# Patient Record
Sex: FEMALE | Race: WHITE | URBAN - METROPOLITAN AREA
[De-identification: names, ages, dates, MRNs, and addresses within clinical notes are randomized per-mention and may not be internally consistent; named-entity substitution may affect disease eponyms.]

---

## 2017-01-09 ENCOUNTER — OFFICE VISIT (OUTPATIENT)
Dept: INTERNAL MEDICINE CLINIC | Age: 82
End: 2017-01-09

## 2017-01-09 VITALS
BODY MASS INDEX: 18.29 KG/M2 | DIASTOLIC BLOOD PRESSURE: 80 MMHG | WEIGHT: 100 LBS | SYSTOLIC BLOOD PRESSURE: 160 MMHG | HEART RATE: 70 BPM

## 2017-01-09 DIAGNOSIS — E78.2 MIXED HYPERLIPIDEMIA: ICD-10-CM

## 2017-01-09 DIAGNOSIS — I10 ESSENTIAL HYPERTENSION: Primary | ICD-10-CM

## 2017-01-09 LAB
A/G RATIO: 1.4 (ref 1.1–2.2)
ALBUMIN SERPL-MCNC: 4.1 G/DL (ref 3.4–5)
ALP BLD-CCNC: 88 U/L (ref 40–129)
ALT SERPL-CCNC: 11 U/L (ref 10–40)
ANION GAP SERPL CALCULATED.3IONS-SCNC: 15 MMOL/L (ref 3–16)
AST SERPL-CCNC: 18 U/L (ref 15–37)
BILIRUB SERPL-MCNC: 0.4 MG/DL (ref 0–1)
BUN BLDV-MCNC: 22 MG/DL (ref 7–20)
CALCIUM SERPL-MCNC: 10 MG/DL (ref 8.3–10.6)
CHLORIDE BLD-SCNC: 101 MMOL/L (ref 99–110)
CHOLESTEROL, TOTAL: 315 MG/DL (ref 0–199)
CO2: 26 MMOL/L (ref 21–32)
CREAT SERPL-MCNC: 0.7 MG/DL (ref 0.6–1.2)
GFR AFRICAN AMERICAN: >60
GFR NON-AFRICAN AMERICAN: >60
GLOBULIN: 2.9 G/DL
GLUCOSE BLD-MCNC: 101 MG/DL (ref 70–99)
HDLC SERPL-MCNC: 65 MG/DL (ref 40–60)
LDL CHOLESTEROL CALCULATED: 233 MG/DL
POTASSIUM SERPL-SCNC: 4.7 MMOL/L (ref 3.5–5.1)
SODIUM BLD-SCNC: 142 MMOL/L (ref 136–145)
TOTAL PROTEIN: 7 G/DL (ref 6.4–8.2)
TRIGL SERPL-MCNC: 86 MG/DL (ref 0–150)
VLDLC SERPL CALC-MCNC: 17 MG/DL

## 2017-01-09 PROCEDURE — 36415 COLL VENOUS BLD VENIPUNCTURE: CPT | Performed by: INTERNAL MEDICINE

## 2017-01-09 PROCEDURE — 99213 OFFICE O/P EST LOW 20 MIN: CPT | Performed by: INTERNAL MEDICINE

## 2017-01-09 ASSESSMENT — ENCOUNTER SYMPTOMS
GASTROINTESTINAL NEGATIVE: 1
RESPIRATORY NEGATIVE: 1

## 2017-02-03 RX ORDER — ATORVASTATIN CALCIUM 80 MG/1
TABLET, FILM COATED ORAL
Qty: 30 TABLET | Refills: 11 | Status: SHIPPED | OUTPATIENT
Start: 2017-02-03

## 2017-03-10 RX ORDER — B-COMPLEX WITH VITAMIN C
TABLET ORAL
Qty: 60 TABLET | Refills: 5 | Status: SHIPPED | OUTPATIENT
Start: 2017-03-10

## 2017-04-17 ENCOUNTER — OFFICE VISIT (OUTPATIENT)
Dept: INTERNAL MEDICINE CLINIC | Age: 82
End: 2017-04-17

## 2017-04-17 VITALS
SYSTOLIC BLOOD PRESSURE: 150 MMHG | WEIGHT: 101.8 LBS | DIASTOLIC BLOOD PRESSURE: 88 MMHG | RESPIRATION RATE: 14 BRPM | HEART RATE: 70 BPM | BODY MASS INDEX: 18.62 KG/M2

## 2017-04-17 DIAGNOSIS — E78.2 MIXED HYPERLIPIDEMIA: ICD-10-CM

## 2017-04-17 DIAGNOSIS — M81.0 OSTEOPOROSIS: ICD-10-CM

## 2017-04-17 DIAGNOSIS — I10 ESSENTIAL HYPERTENSION: Primary | ICD-10-CM

## 2017-04-17 PROCEDURE — G8427 DOCREV CUR MEDS BY ELIG CLIN: HCPCS | Performed by: INTERNAL MEDICINE

## 2017-04-17 PROCEDURE — 99214 OFFICE O/P EST MOD 30 MIN: CPT | Performed by: INTERNAL MEDICINE

## 2017-04-17 PROCEDURE — 1090F PRES/ABSN URINE INCON ASSESS: CPT | Performed by: INTERNAL MEDICINE

## 2017-04-17 PROCEDURE — 1036F TOBACCO NON-USER: CPT | Performed by: INTERNAL MEDICINE

## 2017-04-17 PROCEDURE — 4040F PNEUMOC VAC/ADMIN/RCVD: CPT | Performed by: INTERNAL MEDICINE

## 2017-04-17 PROCEDURE — G8419 CALC BMI OUT NRM PARAM NOF/U: HCPCS | Performed by: INTERNAL MEDICINE

## 2017-04-17 PROCEDURE — 4005F PHARM THX FOR OP RXD: CPT | Performed by: INTERNAL MEDICINE

## 2017-04-17 PROCEDURE — 1123F ACP DISCUSS/DSCN MKR DOCD: CPT | Performed by: INTERNAL MEDICINE

## 2017-04-17 ASSESSMENT — ENCOUNTER SYMPTOMS
GASTROINTESTINAL NEGATIVE: 1
RESPIRATORY NEGATIVE: 1

## 2017-06-12 ENCOUNTER — OFFICE VISIT (OUTPATIENT)
Dept: INTERNAL MEDICINE CLINIC | Age: 82
End: 2017-06-12

## 2017-06-12 VITALS
DIASTOLIC BLOOD PRESSURE: 80 MMHG | HEART RATE: 90 BPM | RESPIRATION RATE: 14 BRPM | BODY MASS INDEX: 17.93 KG/M2 | WEIGHT: 101.2 LBS | SYSTOLIC BLOOD PRESSURE: 160 MMHG

## 2017-06-12 DIAGNOSIS — H61.21 IMPACTED CERUMEN OF RIGHT EAR: Primary | ICD-10-CM

## 2017-06-12 PROCEDURE — 4040F PNEUMOC VAC/ADMIN/RCVD: CPT | Performed by: INTERNAL MEDICINE

## 2017-06-12 PROCEDURE — 1036F TOBACCO NON-USER: CPT | Performed by: INTERNAL MEDICINE

## 2017-06-12 PROCEDURE — 69209 REMOVE IMPACTED EAR WAX UNI: CPT | Performed by: INTERNAL MEDICINE

## 2017-06-12 PROCEDURE — 1123F ACP DISCUSS/DSCN MKR DOCD: CPT | Performed by: INTERNAL MEDICINE

## 2017-06-12 PROCEDURE — G8427 DOCREV CUR MEDS BY ELIG CLIN: HCPCS | Performed by: INTERNAL MEDICINE

## 2017-06-12 PROCEDURE — 1090F PRES/ABSN URINE INCON ASSESS: CPT | Performed by: INTERNAL MEDICINE

## 2017-06-12 PROCEDURE — 99213 OFFICE O/P EST LOW 20 MIN: CPT | Performed by: INTERNAL MEDICINE

## 2017-06-12 PROCEDURE — G8419 CALC BMI OUT NRM PARAM NOF/U: HCPCS | Performed by: INTERNAL MEDICINE

## 2017-06-12 ASSESSMENT — ENCOUNTER SYMPTOMS
RHINORRHEA: 0
SINUS PRESSURE: 0

## 2017-07-26 ENCOUNTER — OFFICE VISIT (OUTPATIENT)
Dept: INTERNAL MEDICINE CLINIC | Age: 82
End: 2017-07-26

## 2017-07-26 VITALS
DIASTOLIC BLOOD PRESSURE: 78 MMHG | BODY MASS INDEX: 18.07 KG/M2 | HEART RATE: 100 BPM | RESPIRATION RATE: 16 BRPM | SYSTOLIC BLOOD PRESSURE: 122 MMHG | WEIGHT: 102 LBS | OXYGEN SATURATION: 93 %

## 2017-07-26 DIAGNOSIS — I10 ESSENTIAL HYPERTENSION: Primary | ICD-10-CM

## 2017-07-26 PROCEDURE — 1036F TOBACCO NON-USER: CPT | Performed by: INTERNAL MEDICINE

## 2017-07-26 PROCEDURE — 99213 OFFICE O/P EST LOW 20 MIN: CPT | Performed by: INTERNAL MEDICINE

## 2017-07-26 PROCEDURE — G8427 DOCREV CUR MEDS BY ELIG CLIN: HCPCS | Performed by: INTERNAL MEDICINE

## 2017-07-26 PROCEDURE — 4040F PNEUMOC VAC/ADMIN/RCVD: CPT | Performed by: INTERNAL MEDICINE

## 2017-07-26 PROCEDURE — 1090F PRES/ABSN URINE INCON ASSESS: CPT | Performed by: INTERNAL MEDICINE

## 2017-07-26 PROCEDURE — 1123F ACP DISCUSS/DSCN MKR DOCD: CPT | Performed by: INTERNAL MEDICINE

## 2017-07-26 PROCEDURE — G8419 CALC BMI OUT NRM PARAM NOF/U: HCPCS | Performed by: INTERNAL MEDICINE

## 2017-07-26 ASSESSMENT — ENCOUNTER SYMPTOMS: RESPIRATORY NEGATIVE: 1

## 2017-09-12 ENCOUNTER — TELEPHONE (OUTPATIENT)
Dept: INTERNAL MEDICINE CLINIC | Age: 82
End: 2017-09-12

## 2017-09-12 PROBLEM — L02.419 CELLULITIS AND ABSCESS OF LEG: Status: ACTIVE | Noted: 2017-09-12

## 2017-09-12 PROBLEM — L03.119 CELLULITIS AND ABSCESS OF LEG: Status: ACTIVE | Noted: 2017-09-12

## 2017-09-13 PROBLEM — E46 PROTEIN CALORIE MALNUTRITION (HCC): Status: ACTIVE | Noted: 2017-09-13

## 2017-09-13 PROBLEM — I73.9 PVD (PERIPHERAL VASCULAR DISEASE) (HCC): Status: ACTIVE | Noted: 2017-09-13

## 2017-09-14 PROBLEM — L03.119 CELLULITIS AND ABSCESS OF LEG: Status: RESOLVED | Noted: 2017-09-12 | Resolved: 2017-09-14

## 2017-09-14 PROBLEM — Z86.79 HISTORY OF ATRIAL FIBRILLATION: Status: ACTIVE | Noted: 2017-09-14

## 2017-09-14 PROBLEM — L02.419 CELLULITIS AND ABSCESS OF LEG: Status: RESOLVED | Noted: 2017-09-12 | Resolved: 2017-09-14

## 2017-09-15 ENCOUNTER — TELEPHONE (OUTPATIENT)
Dept: INTERNAL MEDICINE CLINIC | Age: 82
End: 2017-09-15

## 2017-09-15 RX ORDER — CLINDAMYCIN HYDROCHLORIDE 300 MG/1
300 CAPSULE ORAL 3 TIMES DAILY
Qty: 21 CAPSULE | Refills: 0 | Status: SHIPPED | OUTPATIENT
Start: 2017-09-15 | End: 2017-09-22

## 2017-10-02 ENCOUNTER — HOSPITAL ENCOUNTER (OUTPATIENT)
Dept: WOUND CARE | Age: 82
Discharge: HOME | End: 2017-10-02
Attending: SURGERY | Admitting: SURGERY

## 2017-10-02 VITALS
HEIGHT: 63 IN | RESPIRATION RATE: 18 BRPM | HEART RATE: 80 BPM | TEMPERATURE: 97.1 F | BODY MASS INDEX: 18.24 KG/M2 | WEIGHT: 102.95 LBS | SYSTOLIC BLOOD PRESSURE: 95 MMHG | DIASTOLIC BLOOD PRESSURE: 62 MMHG

## 2017-10-02 DIAGNOSIS — I70.244 ATHEROSCLEROSIS OF NATIVE ARTERY OF LEFT LOWER EXTREMITY WITH ULCERATION OF MIDFOOT (HCC): ICD-10-CM

## 2017-10-02 PROCEDURE — 11043 DBRDMT MUSC&/FSCA 1ST 20/<: CPT | Performed by: SURGERY

## 2017-10-02 RX ORDER — LIDOCAINE HYDROCHLORIDE 40 MG/ML
SOLUTION TOPICAL ONCE
Status: DISCONTINUED | OUTPATIENT
Start: 2017-10-02 | End: 2017-10-03 | Stop reason: HOSPADM

## 2017-10-02 NOTE — IP AVS SNAPSHOT
Pneumococcal Conjugate 7-valent 10/22/2004 -- -- --    Pneumococcal Polysaccharide ([id number]) 7/22/2013 0.5 mL 10/6/2009 Intramuscular      Last Vitals          Most Recent Value    Temp  97.1 °F (36.2 °C)    Pulse  80    Resp  18    BP  95/62         After Visit Summary    This summary was created for you. Thank you for entrusting your care to us. The following information includes details about your hospital/visit stay along with steps you should take to help with your recovery once you leave the hospital.  In this packet, you will find information about the topics listed below:    · Instructions about your medications including a list of your home medications  · A summary of your hospital visit  · Follow-up appointments once you have left the hospital  · Your care plan at home      You may receive a survey regarding the care you received during your stay. Your input is valuable to us. We encourage you to complete and return your survey in the envelope provided. We hope you will choose us in the future for your healthcare needs. Patient Information     Patient Name Mayda 16, [address]  9/11/1917      Care Provided at:     Name Address Phone       [address] [place] [address] [phone]            Your Visit    Here you will find information about your visit, including the reason for your visit. Please take this sheet with you when you visit your doctor or other health care provider in the future. It will help determine the best possible medical care for you at that time. If you have any questions once you leave the hospital, please call the department phone number listed below. Why you were here     Your primary diagnosis was:  Not on File      Visit Information     Date & Time Provider Department Dept.  Phone    10/2/2017 Khurram Saavedra, [address] [phone]       Follow-up Appointments Certain functionality such as prescription refills, scheduling appointments or sending messages to your provider are not activated if your provider does not use [facility] in his/her office    For questions regarding your MyChart account call [phone]. If you have a clinical question, please call your doctor's office. The information on all pages of the After Visit Summary has been reviewed with me, the patient and/or responsible adult, by my health care provider(s). I had the opportunity to ask questions regarding this information. I understand I should dispose of my armband safely at home to protect my health information. A complete copy of the After Visit Summary has been given to me, the patient and/or responsible adult.            Patient Signature/Responsible Adult:____________________    Clinician Signature:_____________________    Date:_____________________    Time:_____________________

## 2017-10-02 NOTE — PLAN OF CARE
Problem: Wound:  Goal: Will show signs of wound healing; wound closure and no evidence of infection  Will show signs of wound healing; wound closure and no evidence of infection  Outcome: Ongoing    Problem: Pressure Ulcer:  Goal: Signs of wound healing will improve  Signs of wound healing will improve  Outcome: Ongoing  Goal: Absence of new pressure ulcer  Absence of new pressure ulcer  Outcome: Ongoing  Goal: Will show no infection signs and symptoms  Will show no infection signs and symptoms  Outcome: Ongoing    Problem: Falls - Risk of:  Goal: Will remain free from falls  Will remain free from falls  Outcome: Ongoing

## 2017-10-02 NOTE — IP AVS SNAPSHOT
Patient Information     Patient Name Mayda Gabriel, [address] 9/11/1917      SEDATION/ANALGESIA INFORMATION/HOME GOING ADVICE     SEDATION / ANALGESIA INFORMATION / Donna Nash 85 have received the sedation/analgesia medication during your visit    Sedation/analgesia is used during short medical procedures under controlled supervision. The medication will produce a strong relaxation. You will be able to hear, speak and follow instructions, but your memory and alertness will be decreased. You will be able to swallow and breathe on your own. During sedation/analgesia your blood pressure, heart and breathing will be watched closely. After the procedure, you may not remember what was said or done. You may have the following effects from the medication. \" Drowsiness, dizziness, sleepiness or confusion. \" Difficulty remembering or delayed reaction times. \" Loss of fine muscle control or difficulty with your balance especially while walking. \" Difficulty focusing or blurred vision. You may not be aware of slight changes in your behavior and/or your reaction time because of the medication used during the procedure. Therefore you should follow these instructions. \" Have someone responsible help you with your care. \" Do not drive for 24 hours. \" Do not operate equipment for 24 hours (lawnmowers, power tools, kitchen accessories, stove). \" Do not drink any alcoholic beverages for a minimum of 24 hours. \" Do not make important personal, legal or business decisions for 24 hours. \" You may experience dizziness or lightheadedness. Move slowly and carefully, do not make sudden position changes. \" Drink extra amounts of fluids today. \" Increase your diet as tolerated (unless you have received specific instructions from your doctor). \" If you feel nauseated, continue with liquids until the nausea is gone. \" Notify your physician if you have not urinated within 8 hours after the procedure. \" Resume your medications unless otherwise instructed. Contact your physician if you have any questions or concerns.     IF YOU REPORT TO AN EMERGENCY ROOM, DOCTOR'S OFFICE OR HOSPITAL WITHIN 24 HOURS AFTER YOUR PROCEDURE, BRING THIS SHEET AND YOUR AFTER VISIT SUMMARY WITH YOU AND GIVE IT TO THE PHYSICIAN OR NURSE ATTENDING YOU.

## 2017-10-02 NOTE — PROGRESS NOTES
Aleksandra Tolliver 37   Progress Note and Procedure Note      Parker Santillan RECORD NUMBER:  [id number]  AGE: 80 y.o. GENDER: female  : 1917  EPISODE DATE:  10/2/2017    Subjective:     Chief Complaint   Patient presents with    Wound Check     Initial Visit- Left Foot Wound-Noticed by POA on 9/10/17-Unknown origin         HISTORY of PRESENT ILLNESS TERESA Medley is a 80 y.o. female who presents today for wound/ulcer evaluation. History of Wound Context: left foot ulcer  Wound/Ulcer Pain Timing/Severity: mild  Quality of pain: dull  Severity:  2 / 10   Modifying Factors: Pain is relieved/improved with rest  Associated Signs/Symptoms: none    Ulcer Identification:  Ulcer Type: arterial  Contributing Factors: arterial insufficiency    Wound: N/A        PAST MEDICAL HISTORY        Diagnosis Date    Hyperlipidemia     Hypertension        PAST SURGICAL HISTORY    Past Surgical History:   Procedure Laterality Date    HERNIA REPAIR         FAMILY HISTORY    History reviewed. No pertinent family history. SOCIAL HISTORY    Social History   Substance Use Topics    Smoking status: Never Smoker    Smokeless tobacco: Never Used    Alcohol use No       ALLERGIES    No Known Allergies    MEDICATIONS    Current Outpatient Prescriptions on File Prior to Encounter   Medication Sig Dispense Refill    Calcium Carbonate-Vitamin D (OYSTER SHELL CALCIUM/D) 500-200 MG-UNIT TABS TAKE ONE TABLET BY MOUTH TWICE A DAY 60 tablet 5    atorvastatin (LIPITOR) 80 MG tablet TAKE ONE TABLET BY MOUTH DAILY 30 tablet 11    lisinopril-hydrochlorothiazide (PRINZIDE;ZESTORETIC) 10-12.5 MG per tablet Take 1 tablet by mouth daily 30 tablet 11    alendronate (FOSAMAX) 70 MG tablet TAKE ONE TABLET BY MOUTH ONCE WEEKLY 4 tablet 5    aspirin 81 MG EC tablet Take 81 mg by mouth daily. No current facility-administered medications on file prior to encounter.         REVIEW OF SYSTEMS    A comprehensive review of systems was negative. Objective:      BP 95/62  Pulse 80  Temp 97.1 °F (36.2 °C) (Oral)   Resp 18  Ht 5' 3\" (1.6 m)  Wt 102 lb 15.3 oz (46.7 kg)  BMI 18.24 kg/m2    Wt Readings from Last 3 Encounters:   10/02/17 102 lb 15.3 oz (46.7 kg)   09/12/17 102 lb 15.3 oz (46.7 kg)   07/26/17 102 lb (46.3 kg)       PHYSICAL EXAM    General Appearance: alert and oriented to person, place and time, well developed and well- nourished, in no acute distress  Skin: warm and dry, no rash or erythema  Head: normocephalic and atraumatic  Eyes: pupils equal, round, and reactive to light, extraocular eye movements intact, conjunctivae normal  Neck: supple and non-tender without mass, no thyromegaly or thyroid nodules, no cervical lymphadenopathy  Musculoskeletal: normal range of motion, no joint swelling, deformity or tenderness  Neurologic: reflexes normal and symmetric, no cranial nerve deficit, gait, coordination and speech normal      Assessment:      Patient Active Problem List   Diagnosis Code    Acute bronchitis J20.9    Other abnormal blood chemistry R79.89    Fatigue R53.83    Edema R60.9    Unilat ing hernia w/ obst, W/O MENTION GANGRENE K40.30    Spondylosis M47.9    Inguinal hernia RIGHT K40.90    Osteoporosis M81.0    Essential hypertension I10    Mixed hyperlipidemia E78.2    PVD (peripheral vascular disease) ([facility]) I73.9    Protein calorie malnutrition ([address] E46    Cellulitis of left lower extremity L03. 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 Performed by:  Bennett Watt MD    Consent obtained:  Yes    Time out taken:  Yes    Pain Control: Anesthetic  Anesthetic: 4% Topical Xylocaine (2.5 ml) [] Other:      Edema Control:  Apply: [] Compression Stocking []Right Leg []Left Leg   [] Tubigrip []Right Leg Double Layer []Left Leg Double Layer       []Right Leg Single Layer []Left Leg Single Layer   [] SpandaGrip []Right Leg  []Left Leg      []Low compression 5-10 mm/Hg      []Medium compression 10-20 mm/Hg     []High compression  20-30 mm/Hg   every morning immediately when getting up should be applied to affected leg(s) from mid foot to knee making sure to cover the heel. Remove every night before going to bed. [] Elevate leg(s) above the level of the heart when sitting. [] Avoid prolonged standing in one place. [] Elevate arm/hand above the level of the heart []RightArm []LeftArm     Compression:  Apply: [] Multilayer Compression Wrap Applied in Clinic []RightLeg []Left Leg   [] Multi-layer compression. Do not get leg(s) with wrap wet. If wraps become too tight call the center or completely remove the wrap. [] Elevate leg(s) above the level of the heart when sitting. [] Avoid prolonged standing in one place.     Off-Loading:   [] Off-loading when [] walking  [] in bed [] sitting  [] Total non-weight bearing  [] Right Leg  [] Left Leg   [] Assistive Device [] Walker [] Cane  [] Wheelchair  [] Crutches   [] Surgical shoe    [] Podus Boot(s)   [] Foam Boot(s)  [] Roll About    [] Cast Boot [] CROW Boot  [] Other:         Dietary:  [x] Diet as tolerated: [] Calorie Diabetic Diet: [] No Added Salt:  [] Increase Protein: [] Other:   Activity:  [x] Activity as tolerated:  [] Patient has no activity restrictions     [] Strict Bedrest: [] Remain off Work:     [] May return to full duty work:                                   [] Return to work with restrictions:     Return Appointment:  [] Wound and dressing supply provider:   [x] ECF or Home Healthcare: [facility]  [] Nurse visit:  [] Physician or NP scheduled for Nurse Visit:   [x] Return Appointment: With Dr Patti Sandoval  in  [address] [place] [address])  [] Ordered

## 2017-10-09 ENCOUNTER — HOSPITAL ENCOUNTER (OUTPATIENT)
Dept: WOUND CARE | Age: 82
Discharge: HOME | End: 2017-10-09
Attending: SURGERY | Admitting: SURGERY

## 2017-10-09 VITALS
TEMPERATURE: 97.7 F | HEART RATE: 84 BPM | SYSTOLIC BLOOD PRESSURE: 115 MMHG | DIASTOLIC BLOOD PRESSURE: 79 MMHG | RESPIRATION RATE: 18 BRPM

## 2017-10-09 DIAGNOSIS — I70.244 ATHEROSCLEROSIS OF NATIVE ARTERY OF LEFT LOWER EXTREMITY WITH ULCERATION OF MIDFOOT (HCC): Primary | ICD-10-CM

## 2017-10-09 PROCEDURE — 11043 DBRDMT MUSC&/FSCA 1ST 20/<: CPT | Performed by: SURGERY

## 2017-10-09 RX ORDER — LIDOCAINE HYDROCHLORIDE 40 MG/ML
SOLUTION TOPICAL ONCE
Status: DISCONTINUED | OUTPATIENT
Start: 2017-10-09 | End: 2017-10-10 | Stop reason: HOSPADM

## 2017-10-09 NOTE — PROGRESS NOTES
debrided down through and including the removal of epidermis, dermis and subcutaneous tissue and muscle/tendon      Devitalized Tissue Debrided:  fibrin, biofilm and slough    Pre Debridement Measurements:  Are located in the [facility]  Documentation Flow Sheet    Wound/Ulcer #: 1    Post Debridement Measurements:  Wound/Ulcer Descriptions are Pre Debridement except measurements:    Wound 10/02/17 Foot Left;Medial #1 Left Medial Foot-Noticed on 9/10/17 (Active)   Wound Arterial 10/2/2017  3:06 PM   Dressing Status Old drainage 10/9/2017  2:05 PM   Dressing/Treatment Other (Comment) 10/2/2017  3:49 PM   Wound Length (cm) 1.9 cm 10/9/2017  2:05 PM   Wound Width (cm) 1.7 cm 10/9/2017  2:05 PM   Wound Depth (cm)  0.5 10/9/2017  2:05 PM   Calculated Wound Size (cm^2) (l*w) 3.23 cm^2 10/9/2017  2:05 PM   Change in Wound Size % (l*w) 10.28 10/9/2017  2:05 PM   Undermining Starts ___ O'Clock 10 10/2/2017  3:06 PM   Undermining Ends___ O'Clock 3 10/2/2017  3:06 PM   Undermining Maxium Distance (cm) 0.2 10/2/2017  3:06 PM   Wound Assessment Slough; Yellow; Swelling;Pink 10/9/2017  2:05 PM   Margins Undefined edges 10/9/2017  2:05 PM   Sonia-wound Assessment Pink;Swelling 10/9/2017  2:05 PM   Red%Wound Bed 50 10/9/2017  2:05 PM   Yellow%Wound Bed 50 10/9/2017  2:05 PM   Black%Wound Bed 10 10/2/2017  3:06 PM   Drainage Amount Small 10/9/2017  2:05 PM   Drainage Description Serosanguinous 10/9/2017  2:05 PM   Odor None 10/9/2017  2:05 PM   Op First Treatment Date 10/02/17 10/2/2017  3:06 PM   Number of days: 6       Percent of Wound/Ulcer Debrided: 100%    Total Surface Area Debrided:  3.23 sq cm       Diabetic/Pressure/Non Pressure Ulcers only:  Ulcer: Non-Pressure ulcer, muscle necrosis      Estimated Blood Loss:  Minimal    Hemostasis Achieved:  not needed    Procedural Pain:  3  / 10     Post Procedural Pain:  0 / 10     Response to treatment:  Well tolerated by patient.        Plan:     Treatment Note please see attached [x] Daily                        [] Every Other Day                  [] Three times per week                        [] Once a week            [] Do Not Change Dressing                   [] Other:                          Compression:  Apply:            [] Multilayer Compression Wrap Applied in Clinic                  []RightLeg                    []Left Leg                        [] Multi-layer compression. Do not get leg(s) with wrap wet. If wraps become too tight call the center or completely remove the wrap. [] Elevate leg(s) above the level of the heart when sitting.                                         [] Avoid prolonged standing in one place.     Off-Loading:   [] Off-loading when                  [] walking                     [] in bed           [] sitting  [] Total non-weight bearing  [] Right Leg  [] Left Leg                    [x] Assistive Device                   [] Walker                      [] Cane             [x] Wheelchair                [] Crutches                        [] Surgical shoe    [] Podus Boot(s)   [] Foam Boot(s)  [] Roll About                        [] Cast Boot                 [] CROW Boot  [] Other:            Dietary:  [x] Diet as tolerated:                  [] Calorie Diabetic Diet:           [] No Added Salt:  [] Increase Protein:                  [] Other:             Activity:  [x] Activity as tolerated:  [] Patient has no activity restrictions     [] Strict Bedrest:                      [] Remain off Work:     [] May return to full duty work:                                                   [] Return to work with restrictions:                    Return Appointment:  [] Wound and dressing supply provider:   [x] ECF or Home Healthcare: [facility]  [] Nurse visit:               [] Physician or NP scheduled for Nurse Visit:   [x] Return Appointment: With Dr Ant Evans  in  [facility])  [] Ordered tests:      Nurse Case Manger: Tanesha     Electronically signed by Reece Fletcher RN on 10/9/2017 at 2:21 PM    [address] [place] [address] Information: Should you experience any significant changes in your wound(s) or have questions about your wound care, please contact the [address] [place] [address] at [address] 8:30 am - 4:30 pm and Friday 8:30 am - 2:00 pm.  If you need help with your wound outside these hours and cannot wait until we are again available, contact your PCP or go to the hospital emergency room.      Nurse Signature:_______________________     Date: ___________ Time:  ____________        Discharge Nurse Signature        PLEASE NOTE: IF YOU ARE UNABLE TO OBTAIN WOUND SUPPLIES, CONTINUE TO USE THE SUPPLIES YOU HAVE AVAILABLE UNTIL YOU ARE ABLE TO [facility]. IT IS MOST IMPORTANT TO KEEP THE WOUND COVERED AT ALL TIMES.     Physician Signature:_______________________     Date: ___________ Time:  ____________                              [] Dr Ellie Christiansen    [] Dr Eugenio Russell   [] Ofelia Randolph CNP                        [x] Dr Guzman Longoria  [] Dr Marshall Yañez   [] Dr Latina Hodgkins                       [] Dr Griselda Gable                          [] Dr Jona Alvarez   [] Jessa Park NP    [] Dr. Jacobo Whitehead        The information contained in the After Visit Summary has been reviewed with me, the patient and/or responsible adult, by my health care provider(s). I had the opportunity to ask questions regarding this information.   I have elected to receive;        Patient Signature:_______________________     Date: ___________ Time:  ____________     [] Patient unable to sign Discharge Instructions given to ECF/Transportation/POA        []  After Visit Summary  [x]  Comprehensive Discharge Instruction          Electronically signed by Gadiel Huffman MD on 10/9/2017 at 2:46 PM

## 2017-10-18 ENCOUNTER — HOSPITAL ENCOUNTER (OUTPATIENT)
Dept: WOUND CARE | Age: 82
Discharge: HOME | End: 2017-10-18
Attending: PODIATRIST | Admitting: PODIATRIST

## 2017-10-18 VITALS
HEART RATE: 83 BPM | SYSTOLIC BLOOD PRESSURE: 90 MMHG | RESPIRATION RATE: 16 BRPM | TEMPERATURE: 97.4 F | DIASTOLIC BLOOD PRESSURE: 55 MMHG

## 2017-10-18 RX ORDER — LIDOCAINE HYDROCHLORIDE 40 MG/ML
SOLUTION TOPICAL ONCE
Status: DISCONTINUED | OUTPATIENT
Start: 2017-10-18 | End: 2017-10-19 | Stop reason: HOSPADM

## 2017-10-18 NOTE — PLAN OF CARE
Problem: Arterial:  Goal: Optimize blood flow for wound healing  Optimize blood flow for wound healing  Outcome: Ongoing

## 2017-10-18 NOTE — PROGRESS NOTES
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 Performed by: [address] [place] [address] Avenue, DPM    Consent obtained:  Yes    Time out taken:  Yes    Pain Control: Anesthetic  Anesthetic: 4% Topical Xylocaine (2.5 ml)       Debridement:Excisional Debridement    Using curette, #15 blade scalpel and forceps the wound(s)/ulcer(s) was/were sharply debrided down through and including the removal of epidermis, dermis and subcutaneous tissue. Devitalized Tissue Debrided:  fibrin, biofilm and callus    Pre Debridement Measurements:  Are located in the [place]  Documentation Flow Sheet    Wound/Ulcer #: 1    Post Debridement Measurements:  Wound/Ulcer Descriptions are Pre Debridement except measurements:    Wound 10/02/17 Foot Left;Medial #1 Left Medial Foot-Noticed on 9/10/17 (Active)   Wound Arterial 10/2/2017  3:06 PM   Dressing Status Old drainage 10/18/2017  2:12 PM   Dressing Changed Changed/New 10/18/2017  2:42 PM   Dressing/Treatment Other (Comment) 10/18/2017  2:42 PM   Wound Length (cm) 1.6 cm 10/18/2017  2:39 PM   Wound Width (cm) 1.6 cm 10/18/2017  2:39 PM   Wound Depth (cm)  0.3 10/18/2017  2:39 PM   Calculated Wound Size (cm^2) (l*w) 2.56 cm^2 10/18/2017  2:39 PM   Change in Wound Size % (l*w) 28.89 10/18/2017  2:39 PM   Undermining Starts ___ O'Clock 10 10/2/2017  3:06 PM   Undermining Ends___ O'Clock 3 10/2/2017  3:06 PM   Undermining Maxium Distance (cm) 0.2 10/2/2017  3:06 PM   Wound Assessment Slough; Yellow; Swelling;Pink 10/18/2017  2:12 PM   Margins Attached edges 10/18/2017  2:12 PM   Sonia-wound Assessment Pink 10/18/2017  2:12 PM   [place]%Wound Bed 20 10/18/2017  2:12 PM   Red%Wound Bed 50 10/9/2017  2:05 PM   Yellow%Wound Bed 80 10/18/2017  2:12 PM   Black%Wound Bed 10 10/2/2017  3:06 PM   Drainage Amount Small 10/18/2017  2:12 PM   Drainage Description Serosanguinous 10/18/2017  2:12 PM   Odor None 10/18/2017  2:12 PM   Op First Treatment Date 10/02/17 10/2/2017  3:06 PM   Number of days: 16       Percent of Wound/Ulcer Debrided: 100%    Total Surface Area Debrided:  2.56 sq cm       Diabetic/Pressure/Non Pressure Ulcers only:  Ulcer: Pressure ulcer, Stage 2      Estimated Blood Loss:  Minimal    Hemostasis Achieved:  by pressure    Procedural Pain:  0  / 10     Post Procedural Pain:  0 / 10     Response to treatment:  Well tolerated by patient. Plan:     Treatment Note please see attached Discharge Instructions    In my professional opinion this patient would benefit from HBO Therapy: No    Written patient dismissal instructions given to patient and signed by patient or POA. Discharge Instructions       Wound Clinic Physician Orders and Discharge Instructions  [place] [address], [place] [address]  Telephone: [phone]      FAX [phone]     NAME: Carlos Case OF BIRTH:  9/11/1917  MEDICAL RECORD NUMBER:  [id number]  DATE:  10/18/2017     Wound Cleansing:   Do not scrub or use excessive force. Cleanse wound prior to applying a clean dressing with:  [x] Normal Saline                      [x] Keep Wound Dry in Shower    [] Wound Cleanser   [] Cleanse wound with Mild Soap & Water  [] May Shower at Discharge   [] Other:        Topical Treatments:  Do not apply lotions, creams, or ointments to wound bed unless directed. [x] Apply moisturizing lotion to skin surrounding the wound prior to dressing change.  [] Apply antifungal ointment to skin surrounding the wound prior to dressing change.  [] Apply thin film of moisture barrier ointment to skin immediately around wound.   [] Other:       Dressings:                            Wound Location LEFT MEDIAL FOOT                  [x] Apply Primary Dressing: Protein:                  [] Other:             Activity:  [x] Activity as tolerated:  [] Patient has no activity restrictions     [] Strict Bedrest:                      [] Remain off Work:     [] May return to full duty work:                                                   [] Return to work with [address]     Return Appointment:  [] Wound and dressing supply provider:   [x] ECF or Home Healthcare: [facility]  [] Nurse visit:               [] Physician or NP scheduled for Nurse Visit:   [x] Return Appointment: With DR Day Parrish  1 Week(s), THEN DR Shameka Martel ( [facility] [employer])  [] Ordered tests:      Nurse Case Manger: [email or web address]     Electronically signed by Caroline Cerda RN on 10/18/2017 at 2:34 PM    [address] [place] [address] Information: Should you experience any significant changes in your wound(s) or have questions about your wound care, please contact the [address] [place] [address] [phone] [facility] - THURSDAY 8:30 am - 4:30 pm and Friday 8:30 am - 2:00 pm.  If you need help with your wound outside these hours and cannot wait until we are again available, contact your PCP or go to the hospital emergency room.      Nurse Signature:_______________________     Date: ___________ Time:  ____________        Discharge Nurse Signature        PLEASE NOTE: IF YOU ARE UNABLE TO OBTAIN WOUND SUPPLIES, CONTINUE TO USE THE SUPPLIES YOU HAVE AVAILABLE UNTIL YOU ARE ABLE TO [facility]. IT IS MOST IMPORTANT TO KEEP THE WOUND COVERED AT ALL TIMES.       Physician Signature:_______________________     Date: ___________ Time:  ____________                              [] Dr David Carvalho    [] Dr Vernon Austin   [] Noemy Norton CNP                        [] Dr Odell Clancy  [x] Dr Tamia Sheikh   [] Dr Newman Moritz                          [] Dr Millicent Coats NP    [] Dr. Sera Daley        The information contained in the After Visit Summary has been reviewed with me, the patient and/or responsible adult, by my health care provider(s).  I had the opportunity to ask questions regarding this information.  I have elected to receive;        Patient Signature:_______________________     Date: ___________ Time:  ____________     [] Patient unable to sign Discharge Instructions given to ECF/Transportation/POA        [x]  After Visit Summary  []  Comprehensive Discharge Instruction        Electronically signed by Dionne Edmonds DPM on 10/18/2017 at 5:30 PM

## 2017-10-23 ENCOUNTER — HOSPITAL ENCOUNTER (OUTPATIENT)
Dept: WOUND CARE | Age: 82
Discharge: HOME | End: 2017-10-23
Attending: SURGERY | Admitting: SURGERY

## 2017-10-23 VITALS
SYSTOLIC BLOOD PRESSURE: 108 MMHG | TEMPERATURE: 97.8 F | DIASTOLIC BLOOD PRESSURE: 71 MMHG | HEART RATE: 93 BPM | RESPIRATION RATE: 18 BRPM

## 2017-10-23 DIAGNOSIS — I70.244 ATHEROSCLEROSIS OF NATIVE ARTERY OF LEFT LOWER EXTREMITY WITH ULCERATION OF MIDFOOT (HCC): Primary | ICD-10-CM

## 2017-10-23 PROCEDURE — 11042 DBRDMT SUBQ TIS 1ST 20SQCM/<: CPT | Performed by: SURGERY

## 2017-10-23 RX ORDER — LIDOCAINE HYDROCHLORIDE 40 MG/ML
SOLUTION TOPICAL ONCE
Status: DISCONTINUED | OUTPATIENT
Start: 2017-10-23 | End: 2017-10-23

## 2017-10-23 RX ORDER — LIDOCAINE HYDROCHLORIDE 40 MG/ML
SOLUTION TOPICAL ONCE
Status: DISCONTINUED | OUTPATIENT
Start: 2017-10-23 | End: 2017-10-24 | Stop reason: HOSPADM

## 2017-10-23 NOTE — PROGRESS NOTES
[facility]   Progress Note and Procedure Note      Parker Santillan RECORD NUMBER:  [id number]  AGE: 80 y.o. GENDER: female  : 1917  EPISODE DATE:  10/23/2017    Subjective:     Chief Complaint   Patient presents with    Wound Check     Follow up visit for left toe wound         HISTORY of PRESENT ILLNESS TERESA Lakhani is a 80 y.o. female who presents today for wound/ulcer evaluation. History of Wound Context: left foot ulcer  Wound/Ulcer Pain Timing/Severity: mild  Quality of pain: dull  Severity:  2 / 10   Modifying Factors: Pain worsens with walking  Associated Signs/Symptoms: none    Ulcer Identification:  Ulcer Type: arterial  Contributing Factors: arterial insufficiency    Wound: N/A        PAST MEDICAL HISTORY        Diagnosis Date    Hyperlipidemia     Hypertension        PAST SURGICAL HISTORY    Past Surgical History:   Procedure Laterality Date    HERNIA REPAIR         FAMILY HISTORY    History reviewed. No pertinent family history. SOCIAL HISTORY    Social History   Substance Use Topics    Smoking status: Never Smoker    Smokeless tobacco: Never Used    Alcohol use No       ALLERGIES    No Known Allergies    MEDICATIONS    Current Outpatient Prescriptions on File Prior to Encounter   Medication Sig Dispense Refill    B Complex-C-E-Zn (STRESS FORMULA/ZINC PO) Take 1 tablet by mouth daily      Calcium Carbonate-Vitamin D (OYSTER SHELL CALCIUM/D) 500-200 MG-UNIT TABS TAKE ONE TABLET BY MOUTH TWICE A DAY 60 tablet 5    atorvastatin (LIPITOR) 80 MG tablet TAKE ONE TABLET BY MOUTH DAILY 30 tablet 11    lisinopril-hydrochlorothiazide (PRINZIDE;ZESTORETIC) 10-12.5 MG per tablet Take 1 tablet by mouth daily 30 tablet 11    alendronate (FOSAMAX) 70 MG tablet TAKE ONE TABLET BY MOUTH ONCE WEEKLY 4 tablet 5    aspirin 81 MG EC tablet Take 81 mg by mouth daily. No current facility-administered medications on file prior to encounter. REVIEW OF SYSTEMS    A comprehensive review of systems was negative. Objective:      /71   Pulse 93   Temp 97.8 °F (36.6 °C) (Oral)   Resp 18     Wt Readings from Last 3 Encounters:   10/02/17 102 lb 15.3 oz (46.7 kg)   09/12/17 102 lb 15.3 oz (46.7 kg)   07/26/17 102 lb (46.3 kg)       PHYSICAL EXAM    General Appearance: alert and oriented to person, place and time, well developed and well- nourished, in no acute distress  Head: normocephalic and atraumatic  Musculoskeletal: normal range of motion, no joint swelling, deformity or tenderness  Neurologic: reflexes normal and symmetric, no cranial nerve deficit, gait, coordination and speech normal      Assessment:      Patient Active Problem List   Diagnosis Code    Acute bronchitis J20.9    Other specified abnormal findings of blood chemistry R79.89    Fatigue R53.83    Edema R60.9    Unilateral inguinal hernia with obstruction K40.30    Spondylosis M47.9    Inguinal hernia RIGHT K40.90    Osteoporosis M81.0    Essential hypertension I10    Mixed hyperlipidemia E78.2    PVD (peripheral vascular disease) ([address] I73.9    Protein calorie malnutrition ([facility] [address] E46    Cellulitis of left lower extremity L03. 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 Performed by: Mk Quintanilla MD    Consent obtained:  Yes    Time out taken:  Yes    Pain Control: Anesthetic  Anesthetic: 4% Topical Xylocaine (2.5 ml)       Debridement:Excisional Debridement    Using curette the wound(s)/ulcer(s) was/were sharply debrided down through and including the removal of epidermis, dermis and subcutaneous tissue.         Devitalized Tissue Debrided:  fibrin, biofilm and slough    Pre Debridement Measurements:  Are located in the [place]  Documentation Flow Sheet    Wound/Ulcer #: 1    Post Debridement Measurements:  Wound/Ulcer Descriptions are Pre Debridement except measurements:    Wound 10/02/17 Foot Left;Medial #1 Left Medial Foot-Noticed on 9/10/17 (Active)   Wound Type Wound 10/23/2017  2:14 PM   Wound Arterial 10/23/2017  2:14 PM   Dressing Status Old drainage 10/23/2017  2:14 PM   Dressing Changed Changed/New 10/18/2017  2:42 PM   Dressing/Treatment Other (Comment) 10/18/2017  2:42 PM   Wound Length (cm) 0.8 cm 10/23/2017  2:14 PM   Wound Width (cm) 1.6 cm 10/23/2017  2:14 PM   Wound Depth (cm)  .1 10/23/2017  2:14 PM   Calculated Wound Size (cm^2) (l*w) 1.28 cm^2 10/23/2017  2:14 PM   Change in Wound Size % (l*w) 64.44 10/23/2017  2:14 PM   Undermining Starts ___ O'Clock 10 10/2/2017  3:06 PM   Undermining Ends___ O'Clock 3 10/2/2017  3:06 PM   Undermining Maxium Distance (cm) 0.2 10/2/2017  3:06 PM   Wound Assessment Slough;Granulation tissue 10/23/2017  2:14 PM   Margins Attached edges 10/23/2017  2:14 PM   Sonia-wound Assessment White 10/23/2017  2:14 PM   [place]%Wound Bed 20 10/18/2017  2:12 PM   Red%Wound Bed 80 10/23/2017  2:14 PM   Yellow%Wound Bed 20 10/23/2017  2:14 PM   Black%Wound Bed 10 10/2/2017  3:06 PM   Drainage Amount Small 10/23/2017  2:14 PM   Drainage Description Serosanguinous 10/23/2017  2:14 PM   Odor None 10/23/2017  2:14 PM   Op First Treatment Date 10/02/17 10/2/2017  3:06 PM   Number of days: 20       Percent of Wound(s)/Ulcer(s) Debrided: 100%    Total Surface Area Debrided:  1.28 sq cm       Diabetic/Pressure/Non Pressure Ulcers only:  Ulcer: Non-Pressure ulcer, fat layer exposed      Estimated Blood Loss:  Minimal    Hemostasis Achieved:  not needed    Procedural Pain:  2  / 10     Post Procedural Pain:  0 / 10     Response to treatment:  Well tolerated by patient.        Plan:     Treatment Note please see attached Discharge Instructions    In my Dressing                   [] Other:           Compression:  Apply:            [] Multilayer Compression Wrap Applied in Clinic                  []RightLeg                    []Left Leg                        [] Multi-layer compression.  Do not get leg(s) with wrap wet.  If wraps become too tight call the center or completely remove the wrap.                                     [] Elevate leg(s) above the level of the heart when sitting.                                        [] Avoid prolonged standing in one place.     Off-Loading:   [] Off-loading when                  [] walking                     [] VT bed           [] sitting  [] Total non-weight bearing  [] Right Leg  [] Left Leg                    [x] Assistive Device                   [] Walker                      [] Cane             [x] Wheelchair                [] Crutches                        [] Surgical shoe    [] Podus Boot(s)   [] Foam Boot(s)  [] Roll About                        [] Cast Boot                 [] CROW Boot  [] Other:     Dietary:  [x] Diet as tolerated:                  [] Calorie Diabetic Diet:           [] No Added Salt:  [] Increase Protein:                  [] Other:               Activity:  [x] Activity as tolerated:  [] Patient has no activity restrictions     [] Strict Bedrest:                      [] Remain off Work:     [] May return to full duty work: [employer] to work with [employer]     Return Appointment:  [] Wound and dressing supply provider:   [x] ECF or Home Healthcare: [facility]  [] Nurse visit:               [] Physician or NP scheduled for Nurse Visit:   [x] Return Appointment: With DR Day Parrish  1 Week(s), THEN DR Shameka Martel ( [address])  [x] Ordered tests:  XRAY LEFT FOOT     Nurse Case Manger: Betsy Baltazar     Electronically signed by Caroline Cerda RN on 10/23/2017 at 2:35 PM    [facility] Information: Should you experience any significant changes in your wound(s) or have questions about your wound care, please contact the [address]  [phone] [facility] - THURSDAY 8:30 am - 4:30 pm and Friday 8:30 am - 2:00 pm.  If you need help with your wound outside these hours and cannot wait until we are again available, contact your PCP or go to the hospital emergency room.      Nurse Signature:_______________________     Date: ___________ Time:  ____________        Discharge Nurse Signature        PLEASE NOTE: IF YOU ARE UNABLE TO OBTAIN WOUND SUPPLIES, CONTINUE TO USE THE SUPPLIES YOU HAVE AVAILABLE UNTIL YOU ARE ABLE TO [facility]. IT IS MOST IMPORTANT TO KEEP THE WOUND COVERED AT ALL TIMES.       Physician Signature:_______________________     Date: ___________ Time:  ____________                              [] Dr Mayela Middleton    [] Dr Madelyn Syed   [] Noemy Norton CNP                        [x] Dr Yanely Lopez  [] Dr Tremayne Riley   [] Dr Karol Burciaga                          [] Dr Agueda Sanchez NP    [] Dr. Lulu Covington        The information contained in the After Visit Summary has been reviewed with me, the patient and/or responsible adult, by my health care provider(s).  I had the opportunity to ask questions regarding this information.  I have elected to receive;        Patient Signature:_______________________     Date: ___________ Time:  ____________     [] Patient unable to sign Discharge Instructions given to ECF/Transportation/POA        [x]  After Visit Summary  []  Comprehensive Discharge Instruction        Electronically signed by Andreia Delgado MD on 10/23/2017 at 2:48 PM

## 2017-10-25 ENCOUNTER — HOSPITAL ENCOUNTER (OUTPATIENT)
Dept: OTHER | Age: 82
Discharge: HOME | End: 2017-10-25
Attending: SURGERY | Admitting: SURGERY

## 2017-10-25 DIAGNOSIS — I70.244 ATHEROSCLEROSIS OF NATIVE ARTERY OF LEFT LOWER EXTREMITY WITH ULCERATION OF MIDFOOT (HCC): ICD-10-CM

## 2017-11-01 ENCOUNTER — HOSPITAL ENCOUNTER (OUTPATIENT)
Dept: WOUND CARE | Age: 82
Discharge: HOME | End: 2017-11-01
Admitting: NURSE PRACTITIONER

## 2017-11-01 VITALS
TEMPERATURE: 97.4 F | DIASTOLIC BLOOD PRESSURE: 75 MMHG | RESPIRATION RATE: 18 BRPM | HEART RATE: 81 BPM | SYSTOLIC BLOOD PRESSURE: 121 MMHG

## 2017-11-01 DIAGNOSIS — I70.244 ATHEROSCLEROSIS OF NATIVE ARTERY OF LEFT LOWER EXTREMITY WITH ULCERATION OF MIDFOOT (HCC): Primary | ICD-10-CM

## 2017-11-01 PROCEDURE — 97597 DBRDMT OPN WND 1ST 20 CM/<: CPT | Performed by: NURSE PRACTITIONER

## 2017-11-01 RX ORDER — LIDOCAINE HYDROCHLORIDE 40 MG/ML
SOLUTION TOPICAL ONCE
Status: DISCONTINUED | OUTPATIENT
Start: 2017-11-01 | End: 2017-11-02 | Stop reason: HOSPADM

## 2017-11-02 NOTE — PROGRESS NOTES
[facility]   Progress Note and Procedure Note      Parker Santillan RECORD NUMBER:  [id number]  AGE: 80 y.o. GENDER: female  : 1917  EPISODE DATE:  2017    Subjective:     Chief Complaint   Patient presents with    Wound Check       Follow up visit for left toe wound         HISTORY of PRESENT ILLNESS HPI      Tiff Gomez is a 80 y.o. female who presents today for wound/ulcer evaluation. Family states pt has a poor appetite and skipped lunch today. Pt is on a rehab unit at [facility] but has been refusing therapy. Family concerned that she would not be able to stay on Rehab unit if continues to decline therapy visits. They state pt wants to go back to her home but they are not prepared for that possibility and feel pt would be unsafe. Denies constitutional issues but states \"just isn't hungry for much\". States she used to weigh 189 lbs. Had xray of left foot last week. Impression:  Soft tissue ulceration and findings concerning for osteomyelitis involving   the 1st metatarsal head.       Calcifications as well as thickening of the distal Achilles tendon which may   be related to chronic tendinosis or previous injury.         History of Wound Context: left foot ulcer  Wound/Ulcer Pain Timing/Severity: mild  Quality of pain: dull  Severity:  2 / 10   Modifying Factors: Pain worsens with walking  Associated Signs/Symptoms: none     Ulcer Identification:  Ulcer Type: arterial  Contributing Factors: arterial insufficiency     Wound: N/A        PAST MEDICAL HISTORY        Diagnosis Date    Hyperlipidemia     Hypertension        PAST SURGICAL HISTORY    Past Surgical History:   Procedure Laterality Date    HERNIA REPAIR         FAMILY HISTORY    History reviewed. No pertinent family history.     SOCIAL HISTORY    Social History   Substance Use Topics    Smoking status: Never Smoker    Smokeless tobacco: Never Used    Alcohol use No ALLERGIES    No Known Allergies    MEDICATIONS    Current Outpatient Prescriptions on File Prior to Encounter   Medication Sig Dispense Refill    B Complex-C-E-Zn (STRESS FORMULA/ZINC PO) Take 1 tablet by mouth daily      Calcium Carbonate-Vitamin D (OYSTER SHELL CALCIUM/D) 500-200 MG-UNIT TABS TAKE ONE TABLET BY MOUTH TWICE A DAY 60 tablet 5    atorvastatin (LIPITOR) 80 MG tablet TAKE ONE TABLET BY MOUTH DAILY 30 tablet 11    lisinopril-hydrochlorothiazide (PRINZIDE;ZESTORETIC) 10-12.5 MG per tablet Take 1 tablet by mouth daily 30 tablet 11    alendronate (FOSAMAX) 70 MG tablet TAKE ONE TABLET BY MOUTH ONCE WEEKLY 4 tablet 5    aspirin 81 MG EC tablet Take 81 mg by mouth daily. No current facility-administered medications on file prior to encounter. REVIEW OF SYSTEMS    Pertinent items are noted in HPI. Objective:      /75   Pulse 81   Temp 97.4 °F (36.3 °C) (Oral)   Resp 18     Wt Readings from Last 3 Encounters:   10/02/17 102 lb 15.3 oz (46.7 kg)   09/12/17 102 lb 15.3 oz (46.7 kg)   07/26/17 102 lb (46.3 kg)       PHYSICAL EXAM    General Appearance: alert and oriented to person, place and time, pt appears frail. Skin: warm and dry  Head: normocephalic and atraumatic  Eyes: pupils equal, round, and reactive to light  Pulmonary/Chest: , normal air movement, no respiratory distress  Cardiovascular: normal rate, regular rhythm, no murmurs and intact distal pulses  Extremities: no cyanosis and no clubbing  Musculoskeletal: normal range of motion, no joint swelling, deformity or tenderness  Neurologic: gait and coordination normal and speech normal  Wound:   wound bed than in past; noting maceration of surrounding periwound skin. Reviewed old dressing and identified a cover dressing of a non absorbant telfa pad. Spoke with family and will indicated on discharge instructions that only gauze should be used over wound to prevent maceration.   No increase in pain or 11/1/2017  2:47 PM   Calculated Wound Size (cm^2) (l*w) 4.37 cm^2 11/1/2017  2:47 PM   Change in Wound Size % (l*w) -21.39 11/1/2017  2:47 PM   Undermining Starts ___ O'Clock 10 10/2/2017  3:06 PM   Undermining Ends___ O'Clock 3 10/2/2017  3:06 PM   Undermining Maxium Distance (cm) 0.2 10/2/2017  3:06 PM   Wound Assessment Granulation tissue;Slough 11/1/2017  1:57 PM   Margins Undefined edges 11/1/2017  1:57 PM   Sonia-wound Assessment Maceration 11/1/2017  1:57 PM   [place]%Wound Bed 20 11/1/2017  1:57 PM   Red%Wound Bed 10 11/1/2017  1:57 PM   Yellow%Wound Bed 70 11/1/2017  1:57 PM   Black%Wound Bed 10 10/2/2017  3:06 PM   Drainage Amount Small 11/1/2017  1:57 PM   Drainage Description Serosanguinous 11/1/2017  1:57 PM   Odor None 11/1/2017  1:57 PM   Op First Treatment Date 10/02/17 10/2/2017  3:06 PM   Number of days: 30       Percent of Wound(s)/Ulcer(s) Debrided: 100%    Total Surface Area Debrided:  4.37 sq cm       Diabetic/Pressure/Non Pressure Ulcers only:  Ulcer: Non-Pressure ulcer, fat layer exposed      Estimated Blood Loss:  Minimal    Hemostasis Achieved:  not needed    Procedural Pain:  0  / 10     Post Procedural Pain:  0 / 10     Response to treatment:  Well tolerated by patient. Plan:   1. Discussed with family the results of the xray noting there is still concern about osteomyelitis and that Dr. Jaspal Finney would be following up if more testing is needed. 2.  Reviewed with pt and family the need for nutrition for wound healing, prevent weight loss and further decreases in appetite. 3.  Discussed with pt need for continued physical therapy to stay on rehab. Treatment Note please see attached Discharge Instructions    In my professional opinion this patient would benefit from HBO Therapy: No    Written patient dismissal instructions given to patient and signed by patient or POA.          Discharge Instructions       Wound Clinic Physician Orders and Discharge Ruth [address]

## 2017-11-08 ENCOUNTER — HOSPITAL ENCOUNTER (OUTPATIENT)
Dept: WOUND CARE | Age: 82
Discharge: HOME | End: 2017-11-08
Attending: PODIATRIST | Admitting: PODIATRIST

## 2017-11-08 VITALS
SYSTOLIC BLOOD PRESSURE: 135 MMHG | DIASTOLIC BLOOD PRESSURE: 76 MMHG | TEMPERATURE: 97.5 F | HEART RATE: 71 BPM | RESPIRATION RATE: 17 BRPM

## 2017-11-08 DIAGNOSIS — I70.244 ATHEROSCLEROSIS OF NATIVE ARTERY OF LEFT LOWER EXTREMITY WITH ULCERATION OF MIDFOOT (HCC): Primary | ICD-10-CM

## 2017-11-08 RX ORDER — LIDOCAINE HYDROCHLORIDE 40 MG/ML
SOLUTION TOPICAL ONCE
Status: DISCONTINUED | OUTPATIENT
Start: 2017-11-08 | End: 2017-11-09 | Stop reason: HOSPADM

## 2017-11-08 NOTE — PROGRESS NOTES
(LIPITOR) 80 MG tablet TAKE ONE TABLET BY MOUTH DAILY 30 tablet 11    lisinopril-hydrochlorothiazide (PRINZIDE;ZESTORETIC) 10-12.5 MG per tablet Take 1 tablet by mouth daily 30 tablet 11    alendronate (FOSAMAX) 70 MG tablet TAKE ONE TABLET BY MOUTH ONCE WEEKLY 4 tablet 5    aspirin 81 MG EC tablet Take 81 mg by mouth daily. No current facility-administered medications on file prior to encounter. REVIEW OF SYSTEMS    Pertinent items are noted in HPI. Objective:      /76   Pulse 71   Temp 97.5 °F (36.4 °C) (Oral)   Resp 17     Wt Readings from Last 3 Encounters:   10/02/17 102 lb 15.3 oz (46.7 kg)   09/12/17 102 lb 15.3 oz (46.7 kg)   07/26/17 102 lb (46.3 kg)       PHYSICAL EXAM    Patient is alert and oriented x 3, and is in no acute distress.     Vascular: DP and PT pulses not palpable bilateral. No Pedal hair noted bilateral. No Edema noted to ankles. Varicose veins noted to ankles. Derm:  Skin is warm to slightly cool from proximal leg to distal foot bilateral. Skin is thin with decreased turgor. Nails 1-5 bilateral are thickened, yellow and dystrophic. Neuro:  Light touch sensation intact to both feet. Protective sensation intact to 10/10 sites on right foot, but absent to toes 1-5 on left foot via a 5.07 [place]-Debbi monofilament on initial visit. Musculoskeletal: Muscle strength 5/5 with PF/DF/I and E of both feet. Decreased but stable ROM of 1st MTPJ bilateral without pain or crepitus.      Assessment:      Patient Active Problem List   Diagnosis Code    Acute bronchitis J20.9    Other specified abnormal findings of blood chemistry R79.89    Fatigue R53.83    Edema R60.9    Unilateral inguinal hernia with obstruction K40.30    Spondylosis M47.9    Inguinal hernia RIGHT K40.90    Osteoporosis M81.0    Essential hypertension I10    Mixed hyperlipidemia E78.2    PVD (peripheral vascular disease) ([facility] [address] I73.9    Protein calorie malnutrition ([facility] [address] E46    wound with Mild Soap & Water  [] May Shower at Discharge   [] Other:        Topical Treatments:  Do not apply lotions, creams, or ointments to wound bed unless directed. [x] Apply moisturizing lotion to skin surrounding the wound prior to dressing change.  [] Apply antifungal ointment to skin surrounding the wound prior to dressing change.  [] Apply thin film of moisture barrier ointment to skin immediately around wound. [] Other:       Dressings:                            Wound Location LEFT MEDIAL FOOT                  [x] Apply Primary Dressing:                                                                                     [x] Santyl with Moisten saline guaze                 [x] Cover and Secure with:                                           [x] Gauze  **NO TELFA PADS - causing wound masceration**                    [] Carina             [] Kerlix                        [] Ace Wrap                 [x] Cover Roll Tape                 [] ABD                                                            [] Other:                         Avoid contact of tape with skin.   [x] Change dressing:                 [x] Daily                        [] Every Other Day                  [] Three times per week                        [] Once a week            [] Do Not Change Dressing                   [] Other:     Compression:  Apply:            [] Multilayer Compression Wrap Applied in Clinic                  []RightLeg                    []Left Leg                        [] Multi-layer compression.  Do not get leg(s) with wrap wet.  If wraps become too tight call the center or completely remove the wrap.                                     [] Elevate leg(s) above the level of the heart when sitting.                                        [] Avoid prolonged standing in one place.     Off-Loading:   [] Off-loading when                  [] walking                     [] GM bed           [] sitting  [] Total non-weight bearing  [] Right Leg  [] Left Leg                    [x] Assistive Device                   [] Walker                      [] Cane             [x] Wheelchair                [] Crutches                        [] Surgical shoe    [] Podus Boot(s)   [] Foam Boot(s)  [] Roll About                        [] Cast Boot                 [] CROW Boot  [] Other:     Dietary:  [x] Diet as tolerated:                  [] Calorie Diabetic Diet:           [] No Added Salt:  [x] Increase Protein:  PROTEIN SUPPLEMENT TWICE PER DAY BETWEEN MEALS                [] Other:               Activity:  [x] Activity as tolerated:  [] Patient has no activity restrictions     [] Strict Bedrest:                      [] Remain off Work:     [] May return to full duty work:                                                   [] Return to work with [address]     Return Appointment:  [] Wound and dressing supply provider:   [x] ECF or Home Healthcare: [facility]  [] Nurse visit:               [] Physician or NP scheduled for Nurse Visit:   [x] Return Appointment: With DR SMITH  in  1 Week(s), THEN DR Hodgson ( ALTERNATE VISITS)  [x] Ordered tests: MRI LEFT FOOT - PLEASE CALL [phone] TO SCHEDULE     **PLEASE GIVEN MULTIVITAMIN ONE TABLET DAILY**      Nurse Case Manger: Norma Sykes     Electronically signed by Mona Blevins RN on 11/8/2017 at 1300 [facility] Information: Should you experience any significant changes in your wound(s) or have questions about your wound care, please contact the [address] [place] [address] [phone] [facility] 8:30 am - 4:30 pm and Friday 8:30 am - 2:00 pm.  If you need help with your wound outside these hours and cannot wait until we are again available, contact your PCP or go to the hospital emergency room.      Nurse Signature:_______________________     Date: ___________ Time:  ____________        Discharge Nurse Signature        PLEASE NOTE: IF YOU ARE UNABLE TO OBTAIN WOUND SUPPLIES, CONTINUE TO USE THE SUPPLIES YOU HAVE AVAILABLE UNTIL YOU ARE ABLE TO REACH US. IT IS MOST IMPORTANT TO KEEP THE WOUND COVERED AT ALL TIMES.       Physician Signature:_______________________     Date: ___________ Time:  ____________                              [] Dr Nate Cooley    [] Dr Elzbieta Love CNP                        [] Dr Cinthya Farias  [x] Dr Brisa Riosoter   [] Dr Leyla Riley                          [] Dr Shanda Guzman NP    [] Dr. Tita Shafer        The information contained in the After Visit Summary has been reviewed with me, the patient and/or responsible adult, by my health care provider(s).  I had the opportunity to ask questions regarding this information.  I have elected to receive;        Patient Signature:_______________________     Date: ___________ Time:  ____________     [] Patient unable to sign Discharge Instructions given to ECF/Transportation/POA        [x]  After Visit Summary  []  Comprehensive Discharge Instruction        Electronically signed by Yessy Orozco DPM on 11/8/2017 at 2:31 PM

## 2017-11-14 ENCOUNTER — HOSPITAL ENCOUNTER (OUTPATIENT)
Dept: MRI IMAGING | Age: 82
Discharge: HOME | End: 2017-11-14
Attending: INTERNAL MEDICINE | Admitting: INTERNAL MEDICINE

## 2017-11-14 DIAGNOSIS — I70.244 ATHEROSCLEROSIS OF NATIVE ARTERY OF LEFT LOWER EXTREMITY WITH ULCERATION OF MIDFOOT (HCC): ICD-10-CM

## 2017-11-14 DIAGNOSIS — I70.244 ATHEROSCLEROSIS OF NATIVE ARTERIES OF LEFT LEG WITH ULCERATION OF HEEL AND MIDFOOT (HCC): ICD-10-CM

## 2017-11-15 ENCOUNTER — HOSPITAL ENCOUNTER (OUTPATIENT)
Dept: WOUND CARE | Age: 82
Discharge: HOME | End: 2017-11-15
Attending: PODIATRIST | Admitting: PODIATRIST

## 2017-11-15 VITALS
RESPIRATION RATE: 18 BRPM | DIASTOLIC BLOOD PRESSURE: 71 MMHG | HEART RATE: 71 BPM | SYSTOLIC BLOOD PRESSURE: 117 MMHG | TEMPERATURE: 97.6 F

## 2017-11-15 DIAGNOSIS — L03.116 CELLULITIS OF LEFT LOWER EXTREMITY: Primary | ICD-10-CM

## 2017-11-15 RX ORDER — LIDOCAINE HYDROCHLORIDE 40 MG/ML
SOLUTION TOPICAL PRN
Status: DISCONTINUED | OUTPATIENT
Start: 2017-11-15 | End: 2017-11-16 | Stop reason: HOSPADM

## 2017-11-15 NOTE — PROGRESS NOTES
Aleksandra Tolliver 37   Progress Note and Procedure Note      Parker Santillan RECORD NUMBER:  [id number]  AGE: 80 y.o. GENDER: female  : 1917  EPISODE DATE:  11/15/2017    Subjective:     Chief Complaint   Patient presents with    Wound Check     wound left foot         HISTORY of PRESENT ILLNESS HPI     Kar is a 80 y.o. female who presents today for wound/ulcer evaluation. History of Wound Context: Patient presents for follow up of an ulcer on her left foot which she noticed on 09/10/2017. Pt states she does not know how she got the ulcer. Pt states she is getting daily dressing changes with application of Santyl ointment to the ulcer. Pt denies pain to the foot. Pt denies redness to the foot, but admits to some clear drainage. Pt states she was evaluated by Dr. Gordon Campos on 10/23/2017, and was referred to the wound care center. Pt had her MRI right foot done last week. Wound/Ulcer Pain Timing/Severity: none  Quality of pain: N/A  Severity:  0 / 10   Modifying Factors: None  Associated Signs/Symptoms: none    Ulcer Identification:  Ulcer Type: non-pressure  Contributing Factors: chronic pressure    Wound: N/A        PAST MEDICAL HISTORY        Diagnosis Date    Hyperlipidemia     Hypertension        PAST SURGICAL HISTORY    Past Surgical History:   Procedure Laterality Date    HERNIA REPAIR         FAMILY HISTORY    History reviewed. No pertinent family history.     SOCIAL HISTORY    Social History   Substance Use Topics    Smoking status: Never Smoker    Smokeless tobacco: Never Used    Alcohol use No       ALLERGIES    No Known Allergies    MEDICATIONS    Current Outpatient Prescriptions on File Prior to Encounter   Medication Sig Dispense Refill    B Complex-C-E-Zn (STRESS FORMULA/ZINC PO) Take 1 tablet by mouth daily      Calcium Carbonate-Vitamin D (OYSTER SHELL CALCIUM/D) 500-200 MG-UNIT TABS TAKE ONE TABLET BY MOUTH TWICE A DAY 60 tablet 5    atorvastatin (LIPITOR) 80 MG tablet TAKE ONE TABLET BY MOUTH DAILY 30 tablet 11    lisinopril-hydrochlorothiazide (PRINZIDE;ZESTORETIC) 10-12.5 MG per tablet Take 1 tablet by mouth daily 30 tablet 11    alendronate (FOSAMAX) 70 MG tablet TAKE ONE TABLET BY MOUTH ONCE WEEKLY 4 tablet 5    aspirin 81 MG EC tablet Take 81 mg by mouth daily. No current facility-administered medications on file prior to encounter. REVIEW OF SYSTEMS    Pertinent items are noted in HPI. Objective:      /71   Pulse 71   Temp 97.6 °F (36.4 °C) (Oral)   Resp 18     Wt Readings from Last 3 Encounters:   10/02/17 102 lb 15.3 oz (46.7 kg)   09/12/17 102 lb 15.3 oz (46.7 kg)   07/26/17 102 lb (46.3 kg)       PHYSICAL EXAM    Patient is alert and oriented x 3, and is in no acute distress.     Vascular: DP and PT pulses not palpable bilateral. No Pedal hair noted bilateral. No Edema noted to ankles. Varicose veins noted to ankles. Derm:  Skin is warm to slightly cool from proximal leg to distal foot bilateral. Skin is thin with decreased turgor. Nails 1-5 bilateral are thickened, yellow and dystrophic.    Neuro:  Light touch sensation intact to both feet. Protective sensation intact to 10/10 sites on right foot, but absent to toes 1-5 on left foot via a 5.07 [place]-Debbi monofilament on initial visit.    Musculoskeletal: Muscle strength 5/5 with PF/DF/I and E of both feet. Decreased but stable ROM of 1st MTPJ bilateral without pain or crepitus.      Assessment:      Patient Active Problem List   Diagnosis Code    Acute bronchitis J20.9    Other specified abnormal findings of blood chemistry R79.89    Fatigue R53.83    Edema R60.9    Unilateral inguinal hernia with obstruction K40.30    Spondylosis M47.9    Inguinal hernia RIGHT K40.90    Osteoporosis M81.0    Essential hypertension I10    Mixed hyperlipidemia E78.2    PVD (peripheral vascular disease) ([facility] [address] I73.9    Protein calorie malnutrition ([facility] [address] E46    Cellulitis of left lower extremity L03. 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 Performed by: Eulalia Barrios DPM    Consent obtained:  Yes    Time out taken:  Yes    Pain Control: Anesthetic  Anesthetic: 4% Lidocaine Liquid Topical (2.5ml)       Debridement:Excisional Debridement    Using curette the wound(s)/ulcer(s) was/were sharply debrided down through and including the removal of epidermis, dermis and subcutaneous tissue. Devitalized Tissue Debrided:  fibrin, biofilm and callus    Pre Debridement Measurements:  Are located in the [place]  Documentation Flow Sheet    Wound/Ulcer #: 1    Post Debridement Measurements:  Wound/Ulcer Descriptions are Pre Debridement except measurements:    Wound 10/02/17 Foot Left;Medial #1 Left Medial Foot-Noticed on 9/10/17 (Active)   Wound Image   11/1/2017  2:47 PM   Wound Type Wound 11/15/2017  1:28 PM   Wound Arterial 11/15/2017  1:28 PM   Dressing Status Intact; Old drainage 11/15/2017  1:28 PM   Dressing Changed Changed/New 11/15/2017  3:27 PM   Dressing/Treatment Other (Comment) 11/15/2017  3:27 PM   Wound Length (cm) 2.1 cm 11/15/2017  2:39 PM   Wound Width (cm) 2 cm 11/15/2017  2:39 PM   Wound Depth (cm)  0.2 11/15/2017  2:39 PM   Calculated Wound Size (cm^2) (l*w) 4.2 cm^2 11/15/2017  2:39 PM   Change in Wound Size % (l*w) -16.67 11/15/2017  2:39 PM   Undermining Starts ___ O'Clock 10 10/2/2017  3:06 PM   Undermining Ends___ O'Clock 3 10/2/2017  3:06 PM   Undermining Maxium Distance (cm) 0.2 10/2/2017  3:06 PM   Wound Assessment Granulation tissue;Slough 11/15/2017  1:28 PM   Drainage Amount Small 11/15/2017  1:28 PM   Drainage Description Serous 11/15/2017  1:28 PM   Odor None Water  [] May Shower at Discharge   [] Other:        Topical Treatments:  Do not apply lotions, creams, or ointments to wound bed unless directed. [x] Apply moisturizing lotion to skin surrounding the wound prior to dressing change.  [] Apply antifungal ointment to skin surrounding the wound prior to dressing change.  [] Apply thin film of moisture barrier ointment to skin immediately around wound. [] Other:       Dressings:                            Wound Location LEFT MEDIAL FOOT                  [x] Apply Primary Dressing:                                                                                     [x] Santyl with Moisten saline guaze                 [x] Cover and Secure with:                                           [x] Gauze  **NO TELFA PADS - causing wound masceration**                    [] Carina             [] Kerlix                        [] Ace Wrap                 [x] Cover Roll Tape                 [] ABD                                                            [] Other:                         Avoid contact of tape with skin.   [x] Change dressing:                 [x] Daily                        [] Every Other Day                  [] Three times per week                        [] Once a week            [] Do Not Change Dressing                   [] Other:     Compression:  Apply:            [] Multilayer Compression Wrap Applied in Clinic                  []RightLeg                    []Left Leg                        [] Multi-layer compression.  Do not get leg(s) with wrap wet.  If wraps become too tight call the center or completely remove the wrap.                                     [] Elevate leg(s) above the level of the heart when sitting.                                        [] Avoid prolonged standing in one place.     Off-Loading:   [] Off-loading when                  [] walking                     [] IY bed           [] sitting  [] Total non-weight bearing  [] Right Leg

## 2017-11-17 ENCOUNTER — TELEPHONE (OUTPATIENT)
Dept: WOUND CARE | Age: 82
End: 2017-11-17

## 2017-11-17 NOTE — TELEPHONE ENCOUNTER
Called results for wound and tissue culture to MD, Dr. Rodrigo Greene. Per MD no intervention at this time, per MD Dr. Ac Just office to speak with patient.    Electronically signed by Macy Aceves RN on 11/17/2017 at 12:49 PM

## 2017-11-20 LAB
ANAEROBIC CULTURE: ABNORMAL
CULTURE SURGICAL: ABNORMAL
CULTURE SURGICAL: ABNORMAL
GRAM STAIN RESULT: ABNORMAL
ORGANISM: ABNORMAL
ORGANISM: ABNORMAL

## 2017-11-22 ENCOUNTER — TELEPHONE (OUTPATIENT)
Dept: INFECTIOUS DISEASES | Age: 82
End: 2017-11-22

## 2017-11-22 ENCOUNTER — HOSPITAL ENCOUNTER (OUTPATIENT)
Dept: WOUND CARE | Age: 82
Discharge: HOME | End: 2017-11-22
Attending: PODIATRIST | Admitting: PODIATRIST

## 2017-11-22 VITALS
DIASTOLIC BLOOD PRESSURE: 67 MMHG | SYSTOLIC BLOOD PRESSURE: 109 MMHG | TEMPERATURE: 97.8 F | HEART RATE: 72 BPM | RESPIRATION RATE: 17 BRPM

## 2017-11-22 RX ORDER — LIDOCAINE HYDROCHLORIDE 40 MG/ML
SOLUTION TOPICAL PRN
Status: DISCONTINUED | OUTPATIENT
Start: 2017-11-22 | End: 2017-11-23 | Stop reason: HOSPADM

## 2017-11-22 ASSESSMENT — PAIN SCALES - GENERAL: PAINLEVEL_OUTOF10: 0

## 2017-11-22 NOTE — PROGRESS NOTES
tablet 5    atorvastatin (LIPITOR) 80 MG tablet TAKE ONE TABLET BY MOUTH DAILY 30 tablet 11    lisinopril-hydrochlorothiazide (PRINZIDE;ZESTORETIC) 10-12.5 MG per tablet Take 1 tablet by mouth daily 30 tablet 11    alendronate (FOSAMAX) 70 MG tablet TAKE ONE TABLET BY MOUTH ONCE WEEKLY 4 tablet 5    aspirin 81 MG EC tablet Take 81 mg by mouth daily. No current facility-administered medications on file prior to encounter. REVIEW OF SYSTEMS    Pertinent items are noted in HPI. Objective:      /67   Pulse 72   Temp 97.8 °F (36.6 °C) (Oral)   Resp 17     Wt Readings from Last 3 Encounters:   10/02/17 102 lb 15.3 oz (46.7 kg)   09/12/17 102 lb 15.3 oz (46.7 kg)   07/26/17 102 lb (46.3 kg)       PHYSICAL EXAM    Patient is alert and oriented x 3, and is in no acute distress.     Vascular: DP and PT pulses not palpable bilateral. No Pedal hair noted bilateral. No Edema noted to ankles. Varicose veins noted to ankles. Derm:  Skin is warm to slightly cool from proximal leg to distal foot bilateral. Skin is thin with decreased turgor. Nails 1-5 bilateral are thickened, yellow and dystrophic.    Neuro:  Light touch sensation intact to both feet. Protective sensation intact to 10/10 sites on right foot, but absent to toes 1-5 on left foot via a 5.07 [place]-Debbi monofilament on initial visit.    Musculoskeletal: Muscle strength 5/5 with PF/DF/I and E of both feet. Decreased but stable ROM of 1st MTPJ bilateral without pain or crepitus.      Assessment:      Patient Active Problem List   Diagnosis Code    Acute bronchitis J20.9    Other specified abnormal findings of blood chemistry R79.89    Fatigue R53.83    Edema R60.9    Unilateral inguinal hernia with obstruction K40.30    Spondylosis M47.9    Inguinal hernia RIGHT K40.90    Osteoporosis M81.0    Essential hypertension I10    Mixed hyperlipidemia E78.2    PVD (peripheral vascular disease) ([facility] [address] I73.9    Protein calorie 1:21 PM   Drainage Description Serosanguinous 11/22/2017  1:21 PM   Odor None 11/22/2017  1:21 PM   Margins Undefined edges 11/22/2017  1:21 PM   Sonia-wound Assessment Fragile;Pink 11/22/2017  1:21 PM   Non-staged Wound Description Full thickness 11/22/2017  1:21 PM   [place]%Wound Bed 70 11/22/2017  1:21 PM   Red%Wound Bed 10 11/22/2017  1:21 PM   Yellow%Wound Bed 20 11/22/2017  1:21 PM   Black%Wound Bed 10 10/2/2017  3:06 PM   Op First Treatment Date 10/02/17 11/22/2017  1:21 PM   Number of days: 51       Percent of Wound(s)/Ulcer(s) Debrided: 80%    Total Surface Area Debrided:  3.192 sq cm       Diabetic/Pressure/Non Pressure Ulcers only:  Ulcer: Non-Pressure ulcer, fat layer exposed      Estimated Blood Loss:  Minimal    Hemostasis Achieved:  by pressure    Procedural Pain:  0  / 10     Post Procedural Pain:  0 / 10     Response to treatment:  Well tolerated by patient. Plan:     - Reviewed tissue culture results with the patient. Patient is on oral antibiotics managed by Dr. Paul Gonsalez. Treatment Note please see attached Discharge Instructions    Written patient dismissal instructions given to patient and signed by patient or POA. Discharge Instructions            Wound Clinic Physician Orders and Discharge Instructions  [place] [address], [place] [address]  Telephone: [phone]      FAX [phone]     NAME: Laurie Negron OF BIRTH:  9/11/1917  MEDICAL RECORD NUMBER:  [id number]  DATE:  11/22/2017     Wound Cleansing:   Do not scrub or use excessive force. Cleanse wound prior to applying a clean dressing with:  [x] Normal Saline                      [x] Keep Wound Dry in Shower    [] Wound Cleanser   [] Cleanse wound with Mild Soap & Water  [] May Shower at Discharge   [] Other:        Topical Treatments:  Do not apply lotions, creams, or ointments to wound bed unless directed.    [x] Apply moisturizing lotion to skin surrounding the wound prior to dressing change.  [] Apply antifungal ointment to skin surrounding the wound prior to dressing change.  [] Apply thin film of moisture barrier ointment to skin immediately around wound. [] Other:       Dressings:                            Wound Location LEFT MEDIAL FOOT                  [x] Apply Primary Dressing:                                                                                     [x] Santyl with Moisten saline guaze                 [x] Cover and Secure with:                                           [x] Gauze  **NO TELFA PADS - causing wound masceration**                    [] Carina             [] Kerlix                        [] Ace Wrap                 [x] Cover Roll Tape                 [] ABD                                                            [] Other:                         Avoid contact of tape with skin.   [x] Change dressing:                 [x] Daily                        [] Every Other Day                  [] Three times per week                        [] Once a week            [] Do Not Change Dressing                   [] Other:     Compression:  Apply:            [] Multilayer Compression Wrap Applied in Clinic                  []RightLeg                    []Left Leg                        [] Multi-layer compression.  Do not get leg(s) with wrap wet.  If wraps become too tight call the center or completely remove the wrap.                                     [] Elevate leg(s) above the level of the heart when sitting.                                        [] Avoid prolonged standing in one place.     Off-Loading:   [] Off-loading when                  [] walking                     [] CP bed           [] sitting  [] Total non-weight bearing  [] Right Leg  [] Left Leg                    [x] Assistive Device                   [] Walker                      [] Cane             [x] Wheelchair                [] Crutches                        [] Surgical shoe    [] Podus Boot(s)   [] Foam Boot(s)  [] Roll About                        [] Cast Boot                 [] CROW Boot  [] Other:     Dietary:  [x] Diet as tolerated:                  [] Calorie Diabetic Diet:           [] No Added Salt:  [x] Increase Protein:  PROTEIN SUPPLEMENT TWICE PER DAY BETWEEN MEALS                [] Other:               Activity:  [x] Activity as tolerated:  [] Patient has no activity restrictions     [] Strict Bedrest:                      [] Remain off Work:     [] May return to full duty work:                                                   [] Return to work with [employer] [address]     Return Appointment:  [] Wound and dressing supply provider:   [x] ECF or Home Healthcare: [facility]  [] Nurse visit:               [] Physician or NP scheduled for Nurse Visit:    [x] Return Appointment: With DR Nusrat Yepez  in  1 Week(s),   [] Ordered tests:      ** CALL DR NELSON'S OFFICE FOR APPOINTMENT DATE AND TIME  - OFFICE NUMBER [phone]**     **PLEASE GIVEN MULTIVITAMIN ONE TABLET DAILY**       Nurse Case Manger: [facility]             Electronically signed by John Davis RN on 11/22/2017 at 2:11 PM    [id number] [facility]  N Information: Should you experience any significant changes in your wound(s) or have questions about your wound care, please contact the [facility] Emelyn Maurice Greek [phone] [facility] - THURSDAY 8:30 am - 4:30 pm and Friday 8:30 am - 2:00 pm.  If you need help with your wound outside these hours and cannot wait until we are again available, contact your PCP or go to the hospital emergency room.      Nurse Signature:_______________________     Date: ___________ Time:  ____________        Discharge Nurse Signature        PLEASE NOTE: IF YOU ARE UNABLE TO OBTAIN WOUND SUPPLIES, CONTINUE TO USE THE SUPPLIES YOU HAVE AVAILABLE UNTIL YOU ARE ABLE TO [facility]. IT IS MOST IMPORTANT TO KEEP THE WOUND COVERED AT ALL TIMES.       Physician Signature:_______________________     Date: ___________ Time:  ____________                              [] Dr Nate Cooley    [] Dr Elzbieta Love CNP                        [] Dr Cinthya Farias  [x] Dr Brisa Riosoter   [] Dr Leyla Riley                          [] Dr Shanda Guzman NP    [] Dr. Tita Shafer        The information contained in the After Visit Summary has been reviewed with me, the patient and/or responsible adult, by my health care provider(s).  I had the opportunity to ask questions regarding this information.  I have elected to receive;        Patient Signature:_______________________     Date: ___________ Time:  ____________     [] Patient unable to sign Discharge Instructions given to ECF/Transportation/POA        [x]  After Visit Summary  []  Comprehensive Discharge Instruction             Electronically signed by Yessy Orozco DPM on 11/22/2017 at 4:18 PM

## 2017-11-23 LAB
GRAM STAIN RESULT: ABNORMAL
ORGANISM: ABNORMAL
WOUND/ABSCESS: ABNORMAL
WOUND/ABSCESS: ABNORMAL

## 2017-11-24 ENCOUNTER — TELEPHONE (OUTPATIENT)
Dept: INFECTIOUS DISEASES | Age: 82
End: 2017-11-24

## 2017-11-24 NOTE — TELEPHONE ENCOUNTER
Received a call from Gabriella Arboleda stated that [id number] from wound care spoke to  [facility] about seeing this pt but there are no notes/documentation in chart as to where to schedule, please advise and call Gabriella Arboleda to discuss pt care [phone]

## 2017-11-29 ENCOUNTER — HOSPITAL ENCOUNTER (OUTPATIENT)
Dept: WOUND CARE | Age: 82
Discharge: HOME | End: 2017-11-29
Attending: PODIATRIST | Admitting: PODIATRIST

## 2017-11-29 VITALS
TEMPERATURE: 97.5 F | SYSTOLIC BLOOD PRESSURE: 110 MMHG | HEART RATE: 80 BPM | DIASTOLIC BLOOD PRESSURE: 56 MMHG | RESPIRATION RATE: 16 BRPM

## 2017-11-29 RX ORDER — NUT.TX.GLUCOSE INTOLERANCE,SOY
30 BAR ORAL 2 TIMES DAILY
COMMUNITY

## 2017-11-29 RX ORDER — LIDOCAINE HYDROCHLORIDE 40 MG/ML
SOLUTION TOPICAL PRN
Status: DISCONTINUED | OUTPATIENT
Start: 2017-11-29 | End: 2017-11-30 | Stop reason: HOSPADM

## 2017-11-29 NOTE — PROGRESS NOTES
About                        [] KGJH Boot                 [] CROW Boot  [] Other:     Dietary:  [x] Diet as tolerated:                  [] Calorie Diabetic Diet:           [] No Added Salt:  [x] Increase Protein:  PROTEIN SUPPLEMENT TWICE PER DAY BETWEEN MEALS                [] Other:               Activity:  [x] Activity as tolerated:  [] Patient has no activity restrictions     [] Strict Bedrest:                      [] Remain off Work:     [] May return to full duty work:                                                   [] Return to work with [employer]     Return Appointment:  [] Wound and dressing supply provider:   [x] ECF or Home Healthcare: [facility]  [] Nurse visit:               [] Physician or NP scheduled for Nurse Visit:    [x] Return Appointment: With DR Peterson [facility] Week(s),   [] Ordered tests:       ** CALL DR NELSON'S OFFICE FOR APPOINTMENT DATE AND TIME  - OFFICE NUMBER [phone]**     **PLEASE GIVEN MULTIVITAMIN ONE TABLET DAILY**       Nurse Case Manger: [facility]  Electronically signed by Ignacia Gaxiola RN on 11/29/2017 at 2:10 PM  [address] [place] [address] Road: Should you experience any significant changes in your wound(s) or have questions about your wound care, please contact the [address] [place] [address] [phone] [facility] - THURSDAY 8:30 am - 4:30 pm and Friday 8:30 am - 2:00 pm.  If you need help with your wound outside these hours and cannot wait until we are again available, contact your PCP or go to the hospital emergency room.      Nurse Signature:_______________________     Date: ___________ Time:  ____________        Discharge Nurse Signature        PLEASE NOTE: IF YOU ARE UNABLE TO OBTAIN WOUND SUPPLIES, CONTINUE TO USE THE SUPPLIES YOU HAVE AVAILABLE UNTIL YOU ARE ABLE TO [facility]. IT IS MOST IMPORTANT TO KEEP THE WOUND COVERED AT ALL TIMES.       Physician Signature:_______________________     Date: ___________ Time:  ____________                              [] Dr Ilana Alaniz    [] Dr Josué Crum CNP                        [] Dr Lynn Freitas  [x] Dr Elise Benedict   [] Dr Karma Casey                          [] Dr William Vaughan NP    [] Dr. Evangelina Reynolds        The information contained in the After Visit Summary has been reviewed with me, the patient and/or responsible adult, by my health care provider(s).  I had the opportunity to ask questions regarding this information.  I have elected to receive;        Patient Signature:_______________________     Date: ___________ Time:  ____________     [] Patient unable to sign Discharge Instructions given to ECF/Transportation/POA        [x]  After Visit Summary  []  Comprehensive Discharge Instruction        Electronically signed by [facility] on 11/29/2017 at 3:25 PM

## 2017-12-01 RX ORDER — CIPROFLOXACIN 250 MG/1
250 TABLET, FILM COATED ORAL 2 TIMES DAILY
Qty: 60 TABLET | Refills: 1 | Status: SHIPPED | OUTPATIENT
Start: 2017-12-01 | End: 2017-12-04 | Stop reason: SDUPTHER

## 2017-12-06 ENCOUNTER — HOSPITAL ENCOUNTER (OUTPATIENT)
Dept: WOUND CARE | Age: 82
Discharge: HOME | End: 2017-12-06
Attending: PODIATRIST | Admitting: PODIATRIST

## 2017-12-06 VITALS
BODY MASS INDEX: 17.81 KG/M2 | HEART RATE: 88 BPM | SYSTOLIC BLOOD PRESSURE: 126 MMHG | RESPIRATION RATE: 17 BRPM | TEMPERATURE: 97.3 F | WEIGHT: 100.53 LBS | DIASTOLIC BLOOD PRESSURE: 78 MMHG

## 2017-12-06 RX ORDER — LIDOCAINE HYDROCHLORIDE 40 MG/ML
SOLUTION TOPICAL PRN
Status: DISCONTINUED | OUTPATIENT
Start: 2017-12-06 | End: 2017-12-07 | Stop reason: HOSPADM

## 2017-12-06 NOTE — PROGRESS NOTES
Aleksandra Tolliver 37   Progress Note and Procedure Note      Parker Santillan RECORD NUMBER:  [id number]  AGE: 80 y.o. GENDER: female  : 1917  EPISODE DATE:  2017    Subjective:     Chief Complaint   Patient presents with    Wound Check     Follow up for wound on left medial foot         HISTORY of PRESENT ILLNESS TERESA Lakhani is a 80 y.o. female who presents today for wound/ulcer evaluation. History of Wound Context: Patient presents complaining of an ulcer on her left foot which she noticed on 09/10/2017. Pt states she does not know how she got the ulcer. Pt states she is getting daily dressing changes with application of Santyl ointment to the ulcer. Pt denies pain to the foot. Pt denies redness to the foot, but admits to some clear drainage. Pt states she is taking oral antibiotics as prescribed by Dr. Yosef Finnegan without problems. Wound/Ulcer Pain Timing/Severity: mild  Quality of pain: aching  Severity:  2 / 10   Modifying Factors: Pain worsens with pressure to ulcer area  Associated Signs/Symptoms: drainage    Ulcer Identification:  Ulcer Type: non-pressure  Contributing Factors: osteomyelitis left 1st metatarsal    Wound: N/A        PAST MEDICAL HISTORY        Diagnosis Date    Hyperlipidemia     Hypertension        PAST SURGICAL HISTORY    Past Surgical History:   Procedure Laterality Date    HERNIA REPAIR         FAMILY HISTORY    History reviewed. No pertinent family history.     SOCIAL HISTORY    Social History   Substance Use Topics    Smoking status: Never Smoker    Smokeless tobacco: Never Used    Alcohol use No       ALLERGIES    No Known Allergies    MEDICATIONS    Current Outpatient Prescriptions on File Prior to Encounter   Medication Sig Dispense Refill    Nutritional Supplements (PROMOD) LIQD Take 30 mLs by mouth 2 times daily      B Complex-C-E-Zn (STRESS FORMULA/ZINC PO) Take 1 tablet by mouth daily      Calcium Carbonate-Vitamin D M81.0    Essential hypertension I10    Mixed hyperlipidemia E78.2    PVD (peripheral vascular disease) (HCC) I73.9    Protein calorie malnutrition (HCC) E46    Cellulitis of left lower extremity L03. 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 Performed by: Michael Lopez DPM    Consent obtained:  Yes    Time out taken:  Yes    Pain Control: Anesthetic  Anesthetic: 4% Lidocaine Liquid Topical (2.5 ml)       Debridement:Excisional Debridement    Using curette, #15 blade scalpel and forceps the wound(s)/ulcer(s) was/were sharply debrided down through and including the removal of epidermis, dermis and subcutaneous tissue.         Devitalized Tissue Debrided:  fibrin, biofilm and callus    Pre Debridement Measurements:  Are located in the [place]  Documentation Flow Sheet    Wound/Ulcer #: 1    Post Debridement Measurements:  Wound/Ulcer Descriptions are Pre Debridement except measurements:    Wound 10/02/17 Foot Left;Medial #1 Left Medial Foot-Noticed on 9/10/17 (Active)   Wound Image   11/1/2017  2:47 PM   Wound Type Wound 11/22/2017  1:21 PM   Wound Arterial 11/22/2017  1:21 PM   Dressing Status Old drainage 11/29/2017  1:25 PM   Dressing Changed Changed/New 11/15/2017  3:27 PM   Dressing/Treatment Other (Comment) 12/6/2017  2:41 PM   Wound Length (cm) 2.1 cm 12/6/2017  2:06 PM   Wound Width (cm) 1.9 cm 12/6/2017  2:06 PM   Wound Depth (cm)  0.2 12/6/2017  2:06 PM   Calculated Wound Size (cm^2) (l*w) 3.99 cm^2 12/6/2017  2:06 PM   Change in Wound Size % (l*w) -10.83 12/6/2017  2:06 PM   Undermining Starts ___ O'Clock 1 11/29/2017  1:25 PM   Undermining Ends___ O'Clock 3 11/29/2017  1:25 PM   Undermining Maxium Distance (cm) 0.4 11/29/2017  1:25 PM   Wound Assessment Granulation tissue;Red;Slough; Yellow 12/6/2017  1:41 PM   Drainage Amount Small 12/6/2017  1:41 PM   Drainage Description Yellow;Serosanguinous 12/6/2017  1:41 PM   Odor None 12/6/2017  1:41 PM   Margins Undefined edges; Attached edges 12/6/2017  1:41 PM   Exposed structure Bone 11/29/2017  1:25 PM   Sonia-wound Assessment Fragile;Pink;Dry 12/6/2017  1:41 PM   Non-staged Wound Description Full thickness 12/6/2017  1:41 PM   [place]%Wound Bed 55 11/29/2017  1:25 PM   Red%Wound Bed 30 12/6/2017  1:41 PM   Yellow%Wound Bed 70 12/6/2017  1:41 PM   Black%Wound Bed 10 10/2/2017  3:06 PM   Other%Wound Bed 5 11/29/2017  1:25 PM   Op First Treatment Date 10/02/17 12/6/2017  1:41 PM   Number of days: 65       Percent of Wound(s)/Ulcer(s) Debrided: 80%    Total Surface Area Debrided:  3.192 sq cm       Diabetic/Pressure/Non Pressure Ulcers only:  Ulcer: Non-Pressure ulcer, left foot      Estimated Blood Loss:  Minimal    Hemostasis Achieved:  by pressure    Procedural Pain:  0  / 10     Post Procedural Pain:  0 / 10     Response to treatment:  Well tolerated by patient. Plan:     Treatment Note please see attached Discharge Instructions    Written patient dismissal instructions given to patient and signed by patient or POA. Discharge Instructions       Wound Clinic Physician Orders and Discharge Instructions  [address]   [address] [place] [address]  Telephone: [phone]      FAX [phone]     NAME: Brii Rogers OF BIRTH:  9/11/1917  MEDICAL RECORD NUMBER:  [id number]  DATE:  12/6/2017     Wound Cleansing:   Do not scrub or use excessive force.   Cleanse wound prior to applying a clean dressing with:  [x] Normal Saline                      [x] Keep Wound Dry in Shower    [] Wound Cleanser   [] Cleanse wound with Mild Soap & Water  [] May Shower at Discharge   [] Other:        Topical Treatments:  Do not apply lotions, creams, or ointments to wound bed unless directed. [x] Apply moisturizing lotion to skin surrounding the wound prior to dressing change.  [] Apply antifungal ointment to skin surrounding the wound prior to dressing change.  [] Apply thin film of moisture barrier ointment to skin immediately around wound. [] Other:       Dressings:                            Wound Location LEFT MEDIAL FOOT                  [x] Apply Primary Dressing:                                                                                     [x] Plain Collagen (DO NOT MOISTEN)              [x] Cover and Secure with:                                           [x] Gauze  **NO TELFA PADS - causing wound masceration**                    [] Carina             [] Kerlix                        [] Ace Wrap                 [x] Cover Roll Tape                 [] ABD                                                            [] Other:                         Avoid contact of tape with skin.   [x] Change dressing:                 [] Daily                        [] Every Other Day                  [x] Three times per week                        [] Once a week            [] Do Not Change Dressing                   [] Other:     Compression:  Apply:            [] Multilayer Compression Wrap Applied in Clinic                  []RightLeg                    []Left Leg                        [] Multi-layer compression.  Do not get leg(s) with wrap wet.  If wraps become too tight call the center or completely remove the wrap.                                     [] Elevate leg(s) above the level of the heart when sitting.                                        [] Avoid prolonged standing in one place.     Off-Loading:   [] Off-loading when                  [] walking                     [] FW bed           [] sitting  [] Total non-weight bearing  [] Right Leg  [] Left Leg                    [x] Assistive Device                   [] Walker                      [] Cane             [x] Wheelchair                [] Crutches                        [] Surgical shoe    [] Podus Boot(s)   [] Foam Boot(s)  [] Roll About                        [] Cast Boot                 [] CROW Boot  [] Other:     Dietary:  [x] Diet as tolerated:                  [] Calorie Diabetic Diet:           [] No Added Salt:  [x] Increase Protein:  PROTEIN SUPPLEMENT TWICE PER DAY BETWEEN MEALS                [] Other:               Activity:  [x] Activity as tolerated:  [] Patient has no activity restrictions     [] Strict Bedrest:                      [] Remain off Work:     [] May return to full duty work:                                                   [] Return to work with [employer] [address]     Return Appointment:  [] Wound and dressing supply provider:   [x] ECF or Home Healthcare: Jasson Arizmendi  [] Nurse visit:               [] Physician or NP scheduled for Nurse Visit:    [x] Return Appointment: With DR Horacio Tabares [address]  in  1 Week(s),   [] Ordered tests:         **PLEASE GIVEN MULTIVITAMIN ONE TABLET DAILY**       Nurse Case Manger: [facility]  Electronically signed by Diego London RN on 12/6/2017 at 2:09 PM  [facility] Information: Should you experience any significant changes in your wound(s) or have questions about your wound care, please contact the [facility] [place] [facility] [phone] [facility] - THURSDAY 8:30 am - 4:30 pm and Friday 8:30 am - 2:00 pm.  If you need help with your wound outside these hours and cannot wait until we are again available, contact your PCP or go to the hospital emergency room.      Nurse Signature:_______________________     Date: ___________ Time:  ____________        Discharge Nurse Signature        PLEASE NOTE: IF YOU ARE UNABLE TO OBTAIN WOUND SUPPLIES, CONTINUE TO USE THE SUPPLIES YOU HAVE AVAILABLE UNTIL YOU ARE ABLE TO [facility].  IT IS MOST IMPORTANT TO KEEP THE WOUND COVERED AT

## 2017-12-13 ENCOUNTER — HOSPITAL ENCOUNTER (OUTPATIENT)
Dept: WOUND CARE | Age: 82
Discharge: HOME | End: 2017-12-13
Attending: PODIATRIST | Admitting: PODIATRIST

## 2017-12-13 VITALS — SYSTOLIC BLOOD PRESSURE: 113 MMHG | HEART RATE: 85 BPM | RESPIRATION RATE: 18 BRPM | DIASTOLIC BLOOD PRESSURE: 71 MMHG

## 2017-12-13 RX ORDER — LIDOCAINE HYDROCHLORIDE 40 MG/ML
SOLUTION TOPICAL PRN
Status: DISCONTINUED | OUTPATIENT
Start: 2017-12-13 | End: 2017-12-14 | Stop reason: HOSPADM

## 2017-12-13 NOTE — PROGRESS NOTES
Aleksandra Tolliver 37   Progress Note and Procedure Note      Parker Santillan RECORD NUMBER:  [id number]  AGE: 80 y.o. GENDER: female  : 1917  EPISODE DATE:  2017    Subjective:     Chief Complaint   Patient presents with    Wound Check     follow up left medial foot wound         HISTORY of PRESENT ILLNESS TERESA Lakhani is a 80 y.o. female who presents today for wound/ulcer evaluation. History of Wound Context: Patient presents complaining of an ulcer on her left foot which she noticed on 09/10/2017. Pt states she does not know how she got the ulcer. Pt states she is getting daily dressing changes with application of collagen dressing to the ulcer. Pt denies pain to the foot. Pt denies redness to the foot, but admits to some clear drainage. Pt states she is taking oral antibiotics as prescribed by Dr. Reginald Batista without problems. Wound/Ulcer Pain Timing/Severity: mild  Quality of pain: aching  Severity:  2 / 10   Modifying Factors: Pain worsens with pressure to ulcer area  Associated Signs/Symptoms: edema and drainage    Ulcer Identification:  Ulcer Type: non-pressure  Contributing Factors: osteomyelitis right 1st metatarsal    Wound: N/A        PAST MEDICAL HISTORY        Diagnosis Date    Hyperlipidemia     Hypertension        PAST SURGICAL HISTORY    Past Surgical History:   Procedure Laterality Date    HERNIA REPAIR         FAMILY HISTORY    History reviewed. No pertinent family history.     SOCIAL HISTORY    Social History   Substance Use Topics    Smoking status: Never Smoker    Smokeless tobacco: Never Used    Alcohol use No       ALLERGIES    No Known Allergies    MEDICATIONS    Current Outpatient Prescriptions on File Prior to Encounter   Medication Sig Dispense Refill    Nutritional Supplements (PROMOD) LIQD Take 30 mLs by mouth 2 times daily      B Complex-C-E-Zn (STRESS FORMULA/ZINC PO) Take 1 tablet by mouth daily      Calcium Carbonate-Vitamin D Amount Small 12/13/2017  1:18 PM   Drainage Description Yellow;Serosanguinous 12/13/2017  1:18 PM   Odor None 12/13/2017  1:18 PM   Margins Undefined edges 12/13/2017  1:18 PM   Exposed structure Bone 11/29/2017  1:25 PM   Sonia-wound Assessment Maceration 12/13/2017  1:18 PM   Non-staged Wound Description Full thickness 12/13/2017  1:18 PM   [place]%Wound Bed 50 12/13/2017  1:18 PM   Red%Wound Bed 30 12/6/2017  1:41 PM   Yellow%Wound Bed 50 12/13/2017  1:18 PM   Black%Wound Bed 10 10/2/2017  3:06 PM   Other%Wound Bed 5 11/29/2017  1:25 PM   Op First Treatment Date 10/02/17 12/6/2017  1:41 PM   Number of days: 72       Percent of Wound(s)/Ulcer(s) Debrided: 80%    Total Surface Area Debrided:  1.56 sq cm       Diabetic/Pressure/Non Pressure Ulcers only:  Ulcer: Non-Pressure ulcer, fat layer exposed      Estimated Blood Loss:  Minimal    Hemostasis Achieved:  by pressure    Procedural Pain:  2  / 10     Post Procedural Pain:  0 / 10     Response to treatment:  Well tolerated by patient. Plan:     Treatment Note please see attached Discharge Instructions    Written patient dismissal instructions given to patient and signed by patient or POA. Discharge Instructions       Wound Clinic Physician Orders and Discharge Instructions  [place] [address], [place] [address]  Telephone: [phone]      FAX [phone]     NAME: Milton Alexander OF BIRTH:  9/11/1917  MEDICAL RECORD NUMBER:  [id number]  DATE:  12/13/2017     Wound Cleansing:   Do not scrub or use excessive force. Cleanse wound prior to applying a clean dressing with:  [x] Normal Saline                      [x] Keep Wound Dry in Shower    [] Wound Cleanser   [] Cleanse wound with Mild Soap & Water  [] May Shower at Discharge   [] Other:        Topical Treatments:  Do not apply lotions, creams, or ointments to wound bed unless directed.    [x] Apply moisturizing lotion to skin surrounding the    [] Podus Boot(s)   [] Foam Boot(s)  [] Roll About                        [] Cast Boot                 [] CROW Boot  [] Other:     Dietary:  [x] Diet as tolerated:                  [] Calorie Diabetic Diet:           [] No Added Salt:  [x] Increase Protein:  PROTEIN SUPPLEMENT TWICE PER DAY BETWEEN MEALS                [] Other:               Activity:  [x] Activity as tolerated:  [] Patient has no activity restrictions     [] Strict Bedrest:                      [] Remain off Work:     [] May return to full duty work:                                                   [] Return to work with [employer]     Return Appointment:  [] Wound and dressing supply provider:   [x] ECF or Home Healthcare: Jasson Juarez [facility]  [] Nurse visit:               [] Physician or NP scheduled for Nurse Visit:    [x] Return Appointment: With DR Epifanio Hilliard  1 Week(s),   [] Ordered tests:         **PLEASE GIVEN MULTIVITAMIN ONE TABLET DAILY**       Nurse Case Manger: [facility]  Electronically signed by Leann Beal RN on 12/13/2017 at 1:35 PM   [facility] Information: Should you experience any significant changes in your wound(s) or have questions about your wound care, please contact the [address] [place] [address] [phone] [facility] - THURSDAY 8:30 am - 4:30 pm and Friday 8:30 am - 2:00 pm.  If you need help with your wound outside these hours and cannot wait until we are again available, contact your PCP or go to the hospital emergency room.      Nurse Signature:_______________________     Date: ___________ Time:  ____________        Discharge Nurse Signature        PLEASE NOTE: IF YOU ARE UNABLE TO OBTAIN WOUND SUPPLIES, CONTINUE TO USE THE SUPPLIES YOU HAVE AVAILABLE UNTIL YOU ARE ABLE TO [facility]. IT IS MOST IMPORTANT TO KEEP THE WOUND COVERED AT ALL TIMES.       Physician Signature:_______________________     Date: ___________ Time:  ____________                              [] Dr Mayela Middleton    []  Elizabeth Mcneal CNP                        [] Dr Guzman Longoria  [x] Dr Marshall Yañez   [] Dr Nita Oconnell                          [] Dr Krystyna Hernandez NP    [] Dr. Jacobo Whitehead        The information contained in the After Visit Summary has been reviewed with me, the patient and/or responsible adult, by my health care provider(s).  I had the opportunity to ask questions regarding this information.  I have elected to receive;        Patient Signature:_______________________     Date: ___________ Time:  ____________     [] Patient unable to sign Discharge Instructions given to ECF/Transportation/POA        [x]  After Visit Summary  []  Comprehensive Discharge Instruction        Electronically signed by Dulce Aragon DPM on 12/13/2017 at 3:04 PM

## 2017-12-20 ENCOUNTER — HOSPITAL ENCOUNTER (OUTPATIENT)
Dept: WOUND CARE | Age: 82
Discharge: HOME | End: 2017-12-20
Attending: PODIATRIST | Admitting: PODIATRIST

## 2017-12-20 VITALS
DIASTOLIC BLOOD PRESSURE: 71 MMHG | TEMPERATURE: 97.4 F | SYSTOLIC BLOOD PRESSURE: 112 MMHG | RESPIRATION RATE: 18 BRPM | HEART RATE: 80 BPM

## 2017-12-20 RX ORDER — LIDOCAINE HYDROCHLORIDE 40 MG/ML
SOLUTION TOPICAL PRN
Status: DISCONTINUED | OUTPATIENT
Start: 2017-12-20 | End: 2017-12-21 | Stop reason: HOSPADM

## 2017-12-20 NOTE — PROGRESS NOTES
CALCIUM/D) 500-200 MG-UNIT TABS TAKE ONE TABLET BY MOUTH TWICE A DAY 60 tablet 5    atorvastatin (LIPITOR) 80 MG tablet TAKE ONE TABLET BY MOUTH DAILY 30 tablet 11    lisinopril-hydrochlorothiazide (PRINZIDE;ZESTORETIC) 10-12.5 MG per tablet Take 1 tablet by mouth daily 30 tablet 11    alendronate (FOSAMAX) 70 MG tablet TAKE ONE TABLET BY MOUTH ONCE WEEKLY 4 tablet 5    aspirin 81 MG EC tablet Take 81 mg by mouth daily. No current facility-administered medications on file prior to encounter. REVIEW OF SYSTEMS    Pertinent items are noted in HPI. Objective:      /71   Pulse 80   Temp 97.4 °F (36.3 °C) (Oral)   Resp 18     Wt Readings from Last 3 Encounters:   12/06/17 100 lb 8.5 oz (45.6 kg)   10/02/17 102 lb 15.3 oz (46.7 kg)   09/12/17 102 lb 15.3 oz (46.7 kg)       PHYSICAL EXAM    Patient is alert and oriented x 3, and is in no acute distress.     Vascular: DP and PT pulses not palpable bilateral. No Pedal hair noted bilateral. No Edema noted to ankles. Varicose veins noted to ankles. Derm:  Skin is warm to slightly cool from proximal leg to distal foot bilateral. Skin is thin with decreased turgor. Nails 1-5 bilateral are thickened, yellow and dystrophic.    Neuro:  Light touch sensation intact to both feet. Protective sensation intact to 10/10 sites on right foot, but absent to toes 1-5 on left foot via a 5.07 [place] monofilament on initial visit.    Musculoskeletal: Muscle strength 5/5 with PF/DF/I and E of both feet. Decreased but stable ROM of 1st MTPJ bilateral without pain or crepitus.      Assessment:      Patient Active Problem List   Diagnosis Code    Acute bronchitis J20.9    Other specified abnormal findings of blood chemistry R79.89    Fatigue R53.83    Edema R60.9    Unilateral inguinal hernia with obstruction K40.30    Spondylosis M47.9    Inguinal hernia RIGHT K40.90    Osteoporosis M81.0    Essential hypertension I10    Mixed hyperlipidemia lotion to skin surrounding the wound prior to dressing change.  [] Apply antifungal ointment to skin surrounding the wound prior to dressing change.  [] Apply thin film of moisture barrier ointment to skin immediately around wound. [] Other:       Dressings:                            Wound Location LEFT MEDIAL FOOT                  [x] Apply Primary Dressing:                                                                                     [x] Plain Collagen (DO NOT MOISTEN)              [x] Cover and Secure with:                                           [x] Gauze  **NO TELFA PADS - causing wound masceration**                    [] Carina             [] Kerlix                        [] Ace Wrap                 [x] Cover Roll Tape                 [] ABD                                                            [] Other:                         Avoid contact of tape with skin.   [x] Change dressing:                 [] Daily                        [] Every Other Day                  [x] Three times per week                        [] Once a week            [] Do Not Change Dressing                   [] Other:     Compression:  Apply:            [] Multilayer Compression Wrap Applied in Clinic                  []RightLeg                    []Left Leg                        [] Multi-layer compression.  Do not get leg(s) with wrap wet.  If wraps become too tight call the center or completely remove the wrap.                                     [] Elevate leg(s) above the level of the heart when sitting.                                        [] Avoid prolonged standing in one place.     Off-Loading:   [] Off-loading when                  [] walking                     [] [place] bed           [] sitting  [] Total non-weight bearing  [] Right Leg  [] Left Leg                    [x] Assistive Device                   [] Walker                      [] Cane Signature:_______________________     Date: ___________ Time:  ____________                              [] Dr Gladys Whiting    [] Dr Sylvain Cash CNP                        [] Dr Joe Jones  [x] Dr Dannie Mtz   [] Dr Autumn Durán                          [] Dr Jessi Spangler NP    [] Dr. Tony Banks        The information contained in the After Visit Summary has been reviewed with me, the patient and/or responsible adult, by my health care provider(s).  I had the opportunity to ask questions regarding this information.  I have elected to receive;        Patient Signature:_______________________     Date: ___________ Time:  ____________     [] Patient unable to sign Discharge Instructions given to ECF/Transportation/POA        [x]  After Visit Summary  []  Comprehensive Discharge Instruction        Electronically signed by Eulalia Barrios DPM on 12/20/2017 at 4:33 PM

## 2018-01-10 ENCOUNTER — HOSPITAL ENCOUNTER (OUTPATIENT)
Dept: WOUND CARE | Age: 83
Discharge: HOME | End: 2018-01-10
Admitting: PODIATRIST

## 2018-01-10 VITALS
SYSTOLIC BLOOD PRESSURE: 105 MMHG | TEMPERATURE: 98 F | RESPIRATION RATE: 18 BRPM | DIASTOLIC BLOOD PRESSURE: 69 MMHG | HEART RATE: 87 BPM

## 2018-01-10 RX ORDER — LIDOCAINE HYDROCHLORIDE 40 MG/ML
SOLUTION TOPICAL PRN
Status: DISCONTINUED | OUTPATIENT
Start: 2018-01-10 | End: 2018-01-11 | Stop reason: HOSPADM

## 2018-01-10 NOTE — PROGRESS NOTES
Aleksandra Tolliver 37   Progress Note and Procedure Note      Parker Santillan RECORD NUMBER:  [id number]  AGE: 80 y.o. GENDER: female  : 1917  EPISODE DATE:  1/10/2018    Subjective:     Chief Complaint   Patient presents with    Wound Check     wound left foot         HISTORY of PRESENT ILLNESS TERESA Lakhani is a 80 y.o. female who presents today for wound/ulcer evaluation. History of Wound Context: Patient presents complaining of an ulcer on her left foot which she noticed on 09/10/2017. Pt states she does not know how she got the ulcer. Pt resides in a nursing home facility and states she is getting daily dressing changes with application of collagen dressing to the ulcer. Pt denies pain to the foot. Pt denies redness to the foot, but admits to some clear drainage. Pt states she is taking oral antibiotics as prescribed by Dr. Lois Diaz without problems. Wound/Ulcer Pain Timing/Severity: mild  Quality of pain: aching  Severity:  1 / 10   Modifying Factors: Pain worsens with pressure to the ulcer  Associated Signs/Symptoms: edema    Ulcer Identification:  Ulcer Type: non-pressure  Contributing Factors: decreased mobility and osteomyelitis    Wound: N/A        PAST MEDICAL HISTORY        Diagnosis Date    Hyperlipidemia     Hypertension        PAST SURGICAL HISTORY    Past Surgical History:   Procedure Laterality Date    HERNIA REPAIR         FAMILY HISTORY    History reviewed. No pertinent family history.     SOCIAL HISTORY    Social History   Substance Use Topics    Smoking status: Never Smoker    Smokeless tobacco: Never Used    Alcohol use No       ALLERGIES    No Known Allergies    MEDICATIONS    Current Outpatient Prescriptions on File Prior to Encounter   Medication Sig Dispense Refill    Nutritional Supplements (PROMOD) LIQD Take 30 mLs by mouth 2 times daily      B Complex-C-E-Zn (STRESS FORMULA/ZINC PO) Take 1 tablet by mouth daily      Calcium

## 2018-01-17 ENCOUNTER — HOSPITAL ENCOUNTER (OUTPATIENT)
Dept: WOUND CARE | Age: 83
Discharge: HOME | End: 2018-01-24
Attending: PODIATRIST | Admitting: PODIATRIST

## 2018-01-24 ENCOUNTER — HOSPITAL ENCOUNTER (OUTPATIENT)
Dept: WOUND CARE | Age: 83
Discharge: HOME | End: 2018-01-31
Admitting: PODIATRIST

## 2018-01-31 ENCOUNTER — HOSPITAL ENCOUNTER (OUTPATIENT)
Dept: WOUND CARE | Age: 83
Discharge: HOME | End: 2018-02-01
Admitting: PODIATRIST

## 2018-01-31 VITALS
HEART RATE: 98 BPM | TEMPERATURE: 97.5 F | RESPIRATION RATE: 18 BRPM | DIASTOLIC BLOOD PRESSURE: 67 MMHG | SYSTOLIC BLOOD PRESSURE: 101 MMHG

## 2018-01-31 RX ORDER — LIDOCAINE HYDROCHLORIDE 40 MG/ML
SOLUTION TOPICAL PRN
Status: DISCONTINUED | OUTPATIENT
Start: 2018-01-31 | End: 2018-02-14

## 2018-01-31 NOTE — PROGRESS NOTES
Aleksandra Tolliver 37   Progress Note and Procedure Note      Parker Santillan RECORD NUMBER:  [id number]  AGE: 80 y.o. GENDER: female  : 1917  EPISODE DATE:  2018    Subjective:     Chief Complaint   Patient presents with    Wound Check     f/u left medial foot         HISTORY of PRESENT ILLNESS HPI     Kar is a 80 y.o. female who presents today for wound/ulcer evaluation. History of Wound Context: Patient presents complaining of an ulcer on her left foot which she noticed on 09/10/2017. Pt states she does not know how she got the ulcer. Pt resides in a nursing home facility and states she is getting daily dressing changes with application of collagen dressing to the ulcer. Pt denies pain to the foot. Pt denies redness or drainage from the foot. Pt states she is taking oral antibiotics as prescribed by Dr. Ferguson without problems  Wound/Ulcer Pain Timing/Severity: none  Quality of pain: N/A  Severity:  0 / 10   Modifying Factors: None  Associated Signs/Symptoms: drainage    Ulcer Identification:  Ulcer Type: non pressure  Contributing Factors: osteomyelitis    Wound: N/A        PAST MEDICAL HISTORY        Diagnosis Date    Hyperlipidemia     Hypertension        PAST SURGICAL HISTORY    Past Surgical History:   Procedure Laterality Date    HERNIA REPAIR         FAMILY HISTORY    No family history on file.     SOCIAL HISTORY    Social History   Substance Use Topics    Smoking status: Never Smoker    Smokeless tobacco: Never Used    Alcohol use No       ALLERGIES    No Known Allergies    MEDICATIONS    Current Outpatient Prescriptions on File Prior to Encounter   Medication Sig Dispense Refill    Nutritional Supplements (PROMOD) LIQD Take 30 mLs by mouth 2 times daily      B Complex-C-E-Zn (STRESS FORMULA/ZINC PO) Take 1 tablet by mouth daily      Calcium Carbonate-Vitamin D (OYSTER SHELL CALCIUM/D) 500-200 MG-UNIT TABS TAKE ONE TABLET BY MOUTH TWICE A DAY 60 Drainage Description Brown;Yellow 1/31/2018  1:34 PM   Odor None 1/10/2018  1:15 PM   Margins Undefined edges 1/31/2018  1:34 PM   Exposed structure Bone 11/29/2017  1:25 PM   Sonia-wound Assessment Clean;Dark edges 1/31/2018  1:34 PM   Non-staged Wound Description Full thickness 1/31/2018  1:34 PM   [place]%Wound Bed 30 1/31/2018  1:34 PM   Red%Wound Bed 30 12/6/2017  1:41 PM   Yellow%Wound Bed 30 1/31/2018  1:34 PM   Black%Wound Bed 10 10/2/2017  3:06 PM   Other%Wound Bed 5 11/29/2017  1:25 PM   Op First Treatment Date 10/02/17 12/6/2017  1:41 PM   Number of days: 121       Percent of Wound(s)/Ulcer(s) Debrided: 90%    Total Surface Area Debrided:  1.638 sq cm       Diabetic/Pressure/Non Pressure Ulcers only:  Ulcer: Non-Pressure ulcer, fat layer exposed      Estimated Blood Loss:  Minimal    Hemostasis Achieved:  by pressure    Procedural Pain:  0  / 10     Post Procedural Pain:  0 / 10     Response to treatment:  Well tolerated by patient. Plan:     Treatment Note please see attached Discharge Instructions    Written patient dismissal instructions given to patient and signed by patient or POA. Discharge Instructions       Wound Clinic Physician Orders and Discharge Instructions  [address] [address]  Telephone: [phone]      FAX [phone]     NAME: Lauren Allred OF BIRTH:  9/11/1917  MEDICAL RECORD NUMBER:  [id number]  DATE:  1/31/2018     Wound Cleansing:   Do not scrub or use excessive force. Cleanse wound prior to applying a clean dressing with:  [x] Normal Saline                      [x] Keep Wound Dry in Shower    [] Wound Cleanser   [] Cleanse wound with Mild Soap & Water  [] May Shower at Discharge   [] Other:        Topical Treatments:  Do not apply lotions, creams, or ointments to wound bed unless directed.    [x] Apply moisturizing lotion to skin surrounding the wound prior to dressing change.  [] Apply antifungal ointment to skin surrounding the wound prior to dressing change. [x] Apply thin film of moisture barrier ointment to skin immediately around wound. [x] Other:  **Apply Podiatry Pads to Bilateral Reddened Areas on the Lateral Feet**       Dressings:                            Wound Location LEFT MEDIAL FOOT                  [x] Apply Primary Dressing:                                                                                     [x] Plain Collagen (DO NOT MOISTEN)              [x] Cover and Secure with:                                           [x] Gauze  **NO TELFA PADS - causing wound masceration**                    [] Carina             [] Kerlix                        [] Ace Wrap                 [x] Cover Roll Tape                 [] ABD                                                            [] Other:                         Avoid contact of tape with skin.   [x] Change dressing:                 [] Daily                        [] Every Other Day                  [x] Three times per week                        [] Once a week            [] Do Not Change Dressing                   [] Other:     Compression:  Apply:            [] Multilayer Compression Wrap Applied in Clinic                  []RightLeg                    []Left Leg                        [] Multi-layer compression.  Do not get leg(s) with wrap wet.  If wraps become too tight call the center or completely remove the wrap.                                     [] Elevate leg(s) above the level of the heart when sitting.                                        [] Avoid prolonged standing in one place.     Off-Loading:   [] Off-loading when                  [] walking                     [] NS bed           [] sitting  [] Total non-weight bearing  [] Right Leg  [] Left Leg                    [x] Assistive Device                   [] Walker                      [] Cane             [x] Wheelchair                [] Crutches                        [] Surgical shoe    [] Podus Boot(s)   [] Foam Boot(s)  [] Roll About                        [] Cast Boot                 [] CROW Boot  [] Other:     Dietary:  [x] Diet as tolerated:                  [] Calorie Diabetic Diet:           [] No Added Salt:  [x] Increase Protein:  PROTEIN SUPPLEMENT TWICE PER DAY BETWEEN MEALS                [] Other:               Activity:  [x] Activity as tolerated:  [] Patient has no activity restrictions     [] Strict Bedrest:                      [] Remain off Work:     [] May return to full duty work:                                                   [] Return to work with [employer] [address]     Return Appointment:  [] Wound and dressing supply provider:   [x] ECF or Home Healthcare: Jasson Arizmendi  [] Nurse visit:               [] Physician or NP scheduled for Nurse Visit:    [x] Return Appointment: With DR Canseco  in  1 Week(s),   [] Ordered tests:       ** Wear Prevlon Boots to Bilateral Feet when Patient is in Bed- (Script Given in Clinic on 1/31/18)**    **PLEASE GIVE MULTIVITAMIN ONE TABLET DAILY**       Nurse Case Manger: [facility]  Electronically signed by Lila Andrade RN on 1/31/2018 at 2:00 PM  [address]  Information: Should you experience any significant changes in your wound(s) or have questions about your wound care, please contact the [facility] Emelyn Chevard  [phone] [facility] - THURSDAY 8:30 am - 4:30 pm and Friday 8:30 am - 2:00 pm.  If you need help with your wound outside these hours and cannot wait until we are again available, contact your PCP or go to the hospital emergency room.      Nurse Signature:_______________________     Date: ___________ Time:  ____________        Discharge Nurse Signature        PLEASE NOTE: IF YOU ARE UNABLE TO OBTAIN WOUND SUPPLIES, CONTINUE TO USE THE SUPPLIES YOU HAVE AVAILABLE UNTIL YOU ARE ABLE TO [facility].  IT IS MOST IMPORTANT TO KEEP THE WOUND COVERED AT ALL TIMES.       Physician Signature:_______________________     Date: ___________ Time:  ____________                              [] Dr Nelida Car    [] Dr Ceasar Young   [] Noemy Norton CNP                        [] Dr Johan Mathew  [x] Dr Mere Pettit   [] Dr Karan Duncan                          [] Dr Chelly Hooks NP    [] Dr. Saúl Hunt        The information contained in the After Visit Summary has been reviewed with me, the patient and/or responsible adult, by my health care provider(s).  I had the opportunity to ask questions regarding this information.  I have elected to receive;        Patient Signature:_______________________     Date: ___________ Time:  ____________     [] Patient unable to sign Discharge Instructions given to ECF/Transportation/POA        [x]  After Visit Summary  []  Comprehensive Discharge Instruction                 Electronically signed by Fatemeh Neely DPM on 1/31/2018 at 4:33 PM

## 2018-02-14 ENCOUNTER — HOSPITAL ENCOUNTER (OUTPATIENT)
Dept: WOUND CARE | Age: 83
Discharge: HOME | End: 2018-02-14
Admitting: PODIATRIST

## 2018-02-14 VITALS
TEMPERATURE: 97.7 F | HEART RATE: 76 BPM | SYSTOLIC BLOOD PRESSURE: 121 MMHG | DIASTOLIC BLOOD PRESSURE: 76 MMHG | RESPIRATION RATE: 17 BRPM

## 2018-02-14 RX ORDER — ELECTROLYTES/DEXTROSE
1 SOLUTION, ORAL ORAL DAILY
COMMUNITY

## 2018-02-14 RX ORDER — LIDOCAINE HYDROCHLORIDE 40 MG/ML
SOLUTION TOPICAL PRN
Status: DISCONTINUED | OUTPATIENT
Start: 2018-02-14 | End: 2018-02-15 | Stop reason: HOSPADM

## 2018-02-14 RX ORDER — CIPROFLOXACIN 250 MG/1
250 TABLET, FILM COATED ORAL 2 TIMES DAILY
COMMUNITY
End: 2018-06-13 | Stop reason: ALTCHOICE

## 2018-02-14 ASSESSMENT — PAIN SCALES - GENERAL: PAINLEVEL_OUTOF10: 0

## 2018-02-14 NOTE — PROGRESS NOTES
Aleksandra Tolliver 37   Progress Note and Procedure Note      Parker Santillan RECORD NUMBER:  [id number]  AGE: 80 y.o. GENDER: female  : 1917  EPISODE DATE:  2018    Subjective:     Chief Complaint   Patient presents with    Wound Check     Follow up for wound on left medial foot - bunion area         HISTORY of PRESENT ILLNESS TERESA Lakhani is a 80 y.o. female who presents today for wound/ulcer evaluation. History of Wound Context: Patient presents complaining of an ulcer on her left foot which she noticed on 09/10/2017. Pt also has new wounds on the outsides of both feet of one week's duration. Pt resides in a nursing home facility and states she is getting daily dressing changes with application of collagen dressing. Pt denies pain to the foot. Pt denies redness or drainage from the foot. Pt states she is taking oral antibiotics as prescribed by Dr. Darby García without problems. Wound/Ulcer Pain Timing/Severity: none  Quality of pain: N/A  Severity:  0 / 10   Modifying Factors: None  Associated Signs/Symptoms: drainage    Ulcer Identification:  Ulcer Type: pressure  Contributing Factors: chronic pressure, decreased mobility and osteomyelitis left 1st metatarsal    Wound: N/A        PAST MEDICAL HISTORY        Diagnosis Date    Hyperlipidemia     Hypertension        PAST SURGICAL HISTORY    Past Surgical History:   Procedure Laterality Date    HERNIA REPAIR         FAMILY HISTORY    No family history on file.     SOCIAL HISTORY    Social History   Substance Use Topics    Smoking status: Never Smoker    Smokeless tobacco: Never Used    Alcohol use No       ALLERGIES    No Known Allergies    MEDICATIONS    Current Outpatient Prescriptions on File Prior to Encounter   Medication Sig Dispense Refill    Nutritional Supplements (PROMOD) LIQD Take 30 mLs by mouth 2 times daily      B Complex-C-E-Zn (STRESS FORMULA/ZINC PO) Take 1 tablet by mouth daily      Calcium Carbonate-Vitamin D (OYSTER SHELL CALCIUM/D) 500-200 MG-UNIT TABS TAKE ONE TABLET BY MOUTH TWICE A DAY 60 tablet 5    atorvastatin (LIPITOR) 80 MG tablet TAKE ONE TABLET BY MOUTH DAILY 30 tablet 11    lisinopril-hydrochlorothiazide (PRINZIDE;ZESTORETIC) 10-12.5 MG per tablet Take 1 tablet by mouth daily 30 tablet 11    alendronate (FOSAMAX) 70 MG tablet TAKE ONE TABLET BY MOUTH ONCE WEEKLY 4 tablet 5    aspirin 81 MG EC tablet Take 81 mg by mouth daily. No current facility-administered medications on file prior to encounter. REVIEW OF SYSTEMS    Pertinent items are noted in HPI. Objective:      /76   Pulse 76   Temp 97.7 °F (36.5 °C) (Oral)   Resp 17     Wt Readings from Last 3 Encounters:   12/06/17 100 lb 8.5 oz (45.6 kg)   10/02/17 102 lb 15.3 oz (46.7 kg)   09/12/17 102 lb 15.3 oz (46.7 kg)       PHYSICAL EXAM    Patient is alert and oriented x 3, and is in no acute distress.     Vascular: DP and PT pulses not palpable bilateral. No Pedal hair noted bilateral. No Edema noted to ankles. Varicose veins noted to ankles. Derm:  Skin is warm to slightly cool from proximal leg to distal foot bilateral. Skin is thin with decreased turgor. Nails 1-5 bilateral are thickened, yellow and dystrophic.    Neuro:  Light touch sensation intact to both feet. Protective sensation intact to 10/10 sites on right foot, but absent to toes 1-5 on left foot via a 5.07 [place] monofilament on initial visit.    Musculoskeletal: Muscle strength 5/5 with PF/DF/I and E of both feet. Decreased but stable ROM of 1st MTPJ bilateral without pain or crepitus.      Assessment:      Patient Active Problem List   Diagnosis Code    Acute bronchitis J20.9    Other specified abnormal findings of blood chemistry R79.89    Fatigue R53.83    Edema R60.9    Unilateral inguinal hernia with obstruction K40.30    Spondylosis M47.9    Inguinal hernia RIGHT K40.90    Osteoporosis M81.0    Essential hypertension I10    Mixed hyperlipidemia E78.2    PVD (peripheral vascular disease) ([facility]) I73.9    Protein calorie malnutrition ([facility]) E46    Cellulitis of left lower extremity L03. 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 Performed by: Montrell Parrish DPM    Consent obtained:  Yes    Time out taken:  Yes    Pain Control: Anesthetic  Anesthetic: 4% Lidocaine Liquid Topical (2.5 ml)       Debridement:Excisional Debridement    Using curette, #15 blade scalpel and forceps the wound(s)/ulcer(s) was/were sharply debrided down through and including the removal of epidermis, dermis and subcutaneous tissue. Devitalized Tissue Debrided:  fibrin, biofilm, slough and callus    Pre Debridement Measurements:  Are located in the [place]  Documentation Flow Sheet    Wound/Ulcer #: 1, 2 and 3    Post Debridement Measurements:  Wound/Ulcer Descriptions are Pre Debridement except measurements:    Wound 10/02/17 Foot Left;Medial #1 Left Medial Foot-Noticed on 9/10/17 (Active)   Wound Image   2/14/2018  1:35 PM   Wound Type Wound 2/14/2018  1:35 PM   Wound Arterial 2/14/2018  1:35 PM   Dressing Status Intact; Old drainage 2/14/2018  1:35 PM   Dressing Changed Changed/New 2/14/2018  2:11 PM   Dressing/Treatment Other (Comment) 1/31/2018  2:20 PM   Wound Length (cm) 1.4 cm 2/14/2018  1:46 PM   Wound Width (cm) 1.1 cm 2/14/2018  1:46 PM   Wound Depth (cm)  0.3 2/14/2018  1:46 PM   Calculated Wound Size (cm^2) (l*w) 1.54 cm^2 2/14/2018  1:46 PM   Change in Wound Size % (l*w) 57.22 2/14/2018  1:46 PM   Undermining Starts ___ O'Clock 12 2/14/2018  1:35 PM   Undermining Ends___ O'Clock 12 2/14/2018  1:35 PM   Undermining Maxium Distance (cm) 0.2 2/14/2018  1:35 PM Wound Assessment Granulation tissue;Pink;Slough; Yellow 2/14/2018  1:35 PM   Drainage Amount Scant 2/14/2018  1:35 PM   Drainage Description Yellow;Brown 2/14/2018  1:35 PM   Odor None 2/14/2018  1:35 PM   Margins Undefined edges 2/14/2018  1:35 PM   Exposed structure Bone 2/14/2018  1:35 PM   Sonia-wound Assessment Clean;Dry;Pink 2/14/2018  1:35 PM   Non-staged Wound Description Full thickness 2/14/2018  1:35 PM   [place]%Wound Bed 30 2/14/2018  1:35 PM   Red%Wound Bed 30 12/6/2017  1:41 PM   Yellow%Wound Bed 70 2/14/2018  1:35 PM   Black%Wound Bed 10 10/2/2017  3:06 PM   Other%Wound Bed 5 11/29/2017  1:25 PM   Op First Treatment Date 10/02/17 2/14/2018  1:35 PM   Number of days: 135       Wound 02/14/18 Foot Right;Lateral Wound #2; noted 2/14/18 (Active)   Wound Image   2/14/2018  1:46 PM   Wound Type Wound 2/14/2018  1:35 PM   Wound Pressure Stage  2 2/14/2018  1:35 PM   Dressing Changed Changed/New 2/14/2018  1:56 PM   Wound Length (cm) 1.1 cm 2/14/2018  1:46 PM   Wound Width (cm) 1.2 cm 2/14/2018  1:46 PM   Wound Depth (cm)  0.2 2/14/2018  1:46 PM   Calculated Wound Size (cm^2) (l*w) 1.32 cm^2 2/14/2018  1:46 PM   Change in Wound Size % (l*w) -20 2/14/2018  1:46 PM   Wound Assessment Granulation tissue;Slough 2/14/2018  1:46 PM   Drainage Amount Small 2/14/2018  1:46 PM   Drainage Description Serosanguinous 2/14/2018  1:46 PM   Odor None 2/14/2018  1:46 PM   Margins Attached edges 2/14/2018  1:46 PM   Sonia-wound Assessment Dry;Pink 2/14/2018  1:46 PM   Non-staged Wound Description Full thickness 2/14/2018  1:46 PM   [place]%Wound Bed 95 2/14/2018  1:46 PM   Yellow%Wound Bed 5 2/14/2018  1:46 PM   Op First Treatment Date 02/14/18 2/14/2018  1:46 PM   Number of days: 0       Wound 02/14/18 Foot Left;Lateral Wound #3; noted 2/14/17 (Active)   Wound Image   2/14/2018  1:46 PM   Wound Type Wound 2/14/2018  1:46 PM   Wound Pressure Stage  2 2/14/2018  1:46 PM   Dressing Changed Changed/New 2/14/2018  2:11 PM   Wound Length apply lotions, creams, or ointments to wound bed unless directed. [x] Apply moisturizing lotion to skin surrounding the wound prior to dressing change.  [] Apply antifungal ointment to skin surrounding the wound prior to dressing change. [x] Apply thin film of moisture barrier ointment to skin immediately around wound. [x] Other:  **Apply Podiatry Pads  ([place]) to Bilateral Wounds on the Lateral Feet**       Dressings:                            Wound Location LEFT MEDIAL, LATERAL AND RIGHT LATERAL FEET                  [x] Apply Primary Dressing:                                                                                     [x] Plain Collagen (DO NOT MOISTEN)              [x] Cover and Secure with:                                           [x] Gauze  **NO TELFA PADS - causing wound masceration**                    [] Carina             [] Kerlix                        [] Ace Wrap                 [x] Cover Roll Tape                 [] ABD                                                            [] Other:                         Avoid contact of tape with skin.   [x] Change dressing:                 [] Daily                        [] Every Other Day                  [x] Three times per week                        [] Once a week            [] Do Not Change Dressing                   [] Other:       Compression:  Apply:            [] Multilayer Compression Wrap Applied in Clinic                  []RightLeg                    []Left Leg                        [] Multi-layer compression.  Do not get leg(s) with wrap wet.  If wraps become too tight call the center or completely remove the wrap.                                     [] Elevate leg(s) above the level of the heart when sitting.                                        [] Avoid prolonged standing in one place.     Off-Loading:   [] Off-loading when                  [] walking                     [] ZC bed           [] sitting  [] Total non-weight bearing  [] Right Leg  [] Left Leg                    [x] Assistive Device                   [] Walker                      [] Cane             [x] Wheelchair                [] Crutches                        [] Surgical shoe    [] Podus Boot(s)   [] Foam Boot(s)  [] Roll About                        [] Cast Boot                 [] CROW Boot  [] Other:     Dietary:  [x] Diet as tolerated:                  [] Calorie Diabetic Diet:           [] No Added Salt:  [x] Increase Protein:  PROTEIN SUPPLEMENT TWICE PER DAY BETWEEN MEALS                [] Other:               Activity:  [x] Activity as tolerated:  [] Patient has no activity restrictions     [] Strict Bedrest:                      [] Remain off Work:     [] May return to full duty work:                                                   [] Return to work with [employer] [address]     Return Appointment:  [] Wound and dressing supply provider:   [x] ECF or Home Healthcare: Jasson Juarez [facility]  [] Nurse visit:               [] Physician or NP scheduled for Nurse Visit:    [x] Return Appointment: With DR Carmen Hirsch  1 Week(s),   [] Ordered tests:       ** Wear Prevlon Boots to Bilateral Feet when Patient is in Bed- (Script Given in Clinic on 1/31/18)**     **PLEASE GIVE MULTIVITAMIN ONE TABLET DAILY**       Nurse Case Manger: [facility]  Electronically signed by Chon Alamo RN on 2/14/2018 at 2:01 PM  [address]: Should you experience any significant changes in your wound(s) or have questions about your wound care, please contact the [address] [place] [address] [phone] [facility] - THURSDAY 8:30 am - 4:30 pm and Friday 8:30 am - 2:00 pm.  If you need help with your wound outside these hours and cannot wait until we are again available, contact your PCP or go to the hospital emergency room.      Nurse Signature:_______________________     Date: ___________ Time:  ____________        Discharge Nurse Signature        PLEASE NOTE: IF

## 2018-02-28 ENCOUNTER — HOSPITAL ENCOUNTER (OUTPATIENT)
Dept: WOUND CARE | Age: 83
Discharge: HOME | End: 2018-02-28
Admitting: PODIATRIST

## 2018-02-28 VITALS
RESPIRATION RATE: 18 BRPM | SYSTOLIC BLOOD PRESSURE: 119 MMHG | TEMPERATURE: 97.5 F | HEART RATE: 71 BPM | DIASTOLIC BLOOD PRESSURE: 67 MMHG

## 2018-02-28 RX ORDER — LIDOCAINE HYDROCHLORIDE 40 MG/ML
SOLUTION TOPICAL PRN
Status: DISCONTINUED | OUTPATIENT
Start: 2018-02-28 | End: 2018-03-01 | Stop reason: HOSPADM

## 2018-02-28 NOTE — PROGRESS NOTES
Aleksandra Tolliver 37   Progress Note and Procedure Note      Parker Santillan RECORD NUMBER:  [id number]  AGE: 80 y.o. GENDER: female  : 1917  EPISODE DATE:  2018    Subjective:     Chief Complaint   Patient presents with    Wound Check     f/u bilat feet         HISTORY of PRESENT ILLNESS HPI     Kar is a 80 y.o. female who presents today for wound/ulcer evaluation. History of Wound Context: Patient presents complaining of an ulcer on her left foot which she noticed on 09/10/2017. Pt also has wounds on the outsides of both feet of four weeks duration. Pt resides in a nursing home facility and states she is getting daily dressing changes with application of collagen dressing. Pt denies pain to the foot. Pt denies redness or drainage from the foot. Pt states she is taking oral antibiotics as prescribed by Dr. Kandi Love without problems. Wound/Ulcer Pain Timing/Severity: none  Quality of pain: N/A  Severity:  0 / 10   Modifying Factors: None  Associated Signs/Symptoms: drainage    Ulcer Identification:  Ulcer Type: pressure  Contributing Factors: chronic pressure, decreased mobility and osteomyelitis left 1st metatarsal     Wound: N/A        PAST MEDICAL HISTORY        Diagnosis Date    Hyperlipidemia     Hypertension        PAST SURGICAL HISTORY    Past Surgical History:   Procedure Laterality Date    HERNIA REPAIR         FAMILY HISTORY    No family history on file.     SOCIAL HISTORY    Social History   Substance Use Topics    Smoking status: Never Smoker    Smokeless tobacco: Never Used    Alcohol use No       ALLERGIES    No Known Allergies    MEDICATIONS    Current Outpatient Prescriptions on File Prior to Encounter   Medication Sig Dispense Refill    ciprofloxacin (CIPRO) 250 MG tablet Take 250 mg by mouth 2 times daily      Multiple Vitamins-Minerals (MULTIVITAMIN ADULT) TABS Take 1 tablet by mouth daily      Nutritional Supplements (PROMOD) LIQD Take PM   Undermining Ends___ O'Clock 12 2/14/2018  1:35 PM   Undermining Maxium Distance (cm) 0.2 2/14/2018  1:35 PM   Wound Assessment Pink;Yellow 2/28/2018  1:12 PM   Drainage Amount Scant 2/28/2018  1:12 PM   Drainage Description Yellow 2/28/2018  1:12 PM   Odor None 2/28/2018  1:12 PM   Margins Attached edges 2/28/2018  1:12 PM   Exposed structure Bone 2/28/2018  1:12 PM   Sonia-wound Assessment Pink;Dry 2/28/2018  1:12 PM   Non-staged Wound Description Full thickness 2/28/2018  1:12 PM   [place]%Wound Bed 90 2/28/2018  1:12 PM   Yellow%Wound Bed 10 2/28/2018  1:12 PM   Op First Treatment Date 10/02/17 2/14/2018  1:35 PM   Number of days: 149       Wound 02/14/18 Foot Right;Lateral Wound #2; noted 2/14/18 (Active)   Wound Image   2/14/2018  1:46 PM   Wound Type Wound 2/14/2018  1:35 PM   Wound Pressure Stage  2 2/14/2018  1:35 PM   Dressing Status Intact; Old drainage 2/28/2018  1:12 PM   Dressing Changed Changed/New 2/28/2018  1:52 PM   Dressing/Treatment Other (Comment) 2/28/2018  1:52 PM   Wound Length (cm) 0.6 cm 2/28/2018  1:39 PM   Wound Width (cm) 0.6 cm 2/28/2018  1:39 PM   Wound Depth (cm)  0.4 2/28/2018  1:39 PM   Calculated Wound Size (cm^2) (l*w) 0.36 cm^2 2/28/2018  1:39 PM   Change in Wound Size % (l*w) 67.27 2/28/2018  1:39 PM   Wound Assessment Yellow 2/28/2018  1:12 PM   Drainage Amount Scant 2/28/2018  1:12 PM   Drainage Description Yellow 2/28/2018  1:12 PM   Odor None 2/28/2018  1:12 PM   Margins Attached edges 2/28/2018  1:12 PM   Sonia-wound Assessment Dry 2/28/2018  1:12 PM   Non-staged Wound Description Full thickness 2/28/2018  1:12 PM   [place]%Wound Bed 95 2/14/2018  1:46 PM   Yellow%Wound Bed 100 2/28/2018  1:12 PM   Op First Treatment Date 02/14/18 2/14/2018  1:46 PM   Number of days: 14       Wound 02/14/18 Foot Left;Lateral Wound #3; noted 2/14/17 (Active)   Wound Image   2/14/2018  1:46 PM   Wound Type Wound 2/14/2018  1:46 PM   Wound Pressure Stage  2 2/14/2018  1:46 PM   Dressing Changed

## 2018-03-14 ENCOUNTER — HOSPITAL ENCOUNTER (OUTPATIENT)
Dept: WOUND CARE | Age: 83
Discharge: HOME | End: 2018-03-14
Admitting: PODIATRIST

## 2018-03-14 VITALS
RESPIRATION RATE: 18 BRPM | HEART RATE: 84 BPM | DIASTOLIC BLOOD PRESSURE: 72 MMHG | SYSTOLIC BLOOD PRESSURE: 112 MMHG | TEMPERATURE: 97.8 F

## 2018-03-14 RX ORDER — LIDOCAINE HYDROCHLORIDE 40 MG/ML
SOLUTION TOPICAL PRN
Status: DISCONTINUED | OUTPATIENT
Start: 2018-03-14 | End: 2018-03-15 | Stop reason: HOSPADM

## 2018-03-14 NOTE — PROGRESS NOTES
Spondylosis M47.9    Inguinal hernia RIGHT K40.90    Osteoporosis M81.0    Essential hypertension I10    Mixed hyperlipidemia E78.2    PVD (peripheral vascular disease) (HCC) I73.9    Protein calorie malnutrition (HCC) E46    Cellulitis of left lower extremity L03. 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 Performed by: Lolis Dolan DPM    Consent obtained:  Yes    Time out taken:  Yes    Pain Control: Anesthetic  Anesthetic: 4% Lidocaine Liquid Topical (2.5 ml)       Debridement:Excisional Debridement    Using curette, #15 blade scalpel and forceps the wound(s)/ulcer(s) was/were sharply debrided down through and including the removal of epidermis, dermis and subcutaneous tissue. Devitalized Tissue Debrided:  fibrin, biofilm and callus    Pre Debridement Measurements:  Are located in the Page  Documentation Flow Sheet    Wound/Ulcer #: 1, 2 and 3    Post Debridement Measurements:  Wound/Ulcer Descriptions are Pre Debridement except measurements:    Wound 10/02/17 Foot Left;Medial #1 Left Medial Foot-Noticed on 9/10/17 (Active)   Wound Image   2/14/2018  1:35 PM   Wound Type Wound 3/14/2018  1:40 PM   Wound Arterial 2/14/2018  1:35 PM   Dressing Status Intact; Old drainage 3/14/2018  1:40 PM   Dressing Changed Changed/New 2/28/2018  1:52 PM   Dressing/Treatment Other (Comment) 2/28/2018  1:52 PM   Wound Length (cm) 0.5 cm 3/14/2018  1:58 PM   Wound Width (cm) 0.7 cm 3/14/2018  1:58 PM   Wound Depth (cm)  0.2 3/14/2018  1:58 PM   Calculated Wound Size (cm^2) (l*w) 0.35 cm^2 3/14/2018  1:58 PM   Change in Wound Size % (l*w) 90.28 3/14/2018  1:58 PM   Undermining Starts ___ O'Clock 12 2/14/2018  1:35 PM   Undermining leg(s) above the level of the heart when sitting.                                        [] Avoid prolonged standing in one place.     Off-Loading:   [] Off-loading when                  [] walking                     [] GM bed           [] sitting  [] Total non-weight bearing  [] Right Leg  [] Left Leg                    [x] Assistive Device                   [] Walker                      [] Cane             [x] Wheelchair                [] Crutches                        [] Surgical shoe    [] Podus Boot(s)   [] Foam Boot(s)  [] Roll About                        [] Cast Boot                 [] CROW Boot  [] Other:     Dietary:  [x] Diet as tolerated:                  [] Calorie Diabetic Diet:           [] No Added Salt:  [x] Increase Protein:  PROTEIN SUPPLEMENT TWICE PER DAY BETWEEN MEALS                [] Other:               Activity:  [x] Activity as tolerated:  [] Patient has no activity restrictions     [] Strict Bedrest:                      [] Remain off Work:     [] May return to full duty work: [employer] to work with [employer]     Return Appointment:  [] Wound and dressing supply provider:   [x] ECF or Home Healthcare: [facility]  [] Nurse visit:               [] Physician or NP scheduled for Nurse Visit:    [x] Return Appointment: With DR Canseco  in  1 Week(s),   [] Ordered tests:       ** Wear Prevlon Boots to Bilateral Feet when Patient is in Bed- (Script Given in Clinic on 1/31/18)**     **PLEASE GIVE MULTIVITAMIN ONE TABLET DAILY**       Nurse Case Manger: [facility]    Electronically signed by Ana Mendosa RN on 3/14/2018 at [address] [place] [address] Street Information: Should you experience any significant changes in your wound(s) or have questions about your wound care, please contact the [facility] Emelyn Maurice  [phone] [id number] - THURSDAY 8:30 am - 4:30 pm and Friday 8:30 am - 2:00 pm.  If you need help with your wound outside these hours and cannot wait until we are again available, contact your PCP or go to the hospital emergency room.      Nurse Signature:_______________________     Date: ___________ Time:  ____________        Discharge Nurse Signature        PLEASE NOTE: IF YOU ARE UNABLE TO OBTAIN WOUND SUPPLIES, CONTINUE TO USE THE SUPPLIES YOU HAVE AVAILABLE UNTIL YOU ARE ABLE TO [facility]. IT IS MOST IMPORTANT TO KEEP THE WOUND COVERED AT ALL TIMES.       Physician Signature:_______________________     Date: ___________ Time:  ____________                              [] Dr Warren Krause    [] Dr James Chew CNP                        [] Dr Nancy Mcgarry  [x] Dr Patrick Harrington   [] Dr Sami An                          [] Dr Jacquelyn Guerrero NP    [] Dr. Vahid Yepez        The information contained in the After Visit Summary has been reviewed with me, the patient and/or responsible adult, by my health care provider(s).  I had the opportunity to ask questions regarding this information.         Patient Signature:_______________________     Date: ___________ Time:  ____________     [] Patient unable to sign Discharge Instructions given to ECF/Transportation/POA        [x]  After Visit Summary  []  Comprehensive Discharge Instruction          Electronically signed by Denise Rey DPM on 3/14/2018 at 2:26 PM

## 2018-03-28 ENCOUNTER — HOSPITAL ENCOUNTER (OUTPATIENT)
Dept: WOUND CARE | Age: 83
Discharge: HOME | End: 2018-03-28
Admitting: PODIATRIST

## 2018-03-28 VITALS
RESPIRATION RATE: 18 BRPM | SYSTOLIC BLOOD PRESSURE: 133 MMHG | TEMPERATURE: 97.8 F | HEART RATE: 92 BPM | DIASTOLIC BLOOD PRESSURE: 68 MMHG

## 2018-03-28 RX ORDER — LIDOCAINE HYDROCHLORIDE 40 MG/ML
SOLUTION TOPICAL PRN
Status: DISCONTINUED | OUTPATIENT
Start: 2018-03-28 | End: 2018-03-29 | Stop reason: HOSPADM

## 2018-03-28 NOTE — PROGRESS NOTES
Unilateral inguinal hernia with obstruction K40.30    Spondylosis M47.9    Inguinal hernia RIGHT K40.90    Osteoporosis M81.0    Essential hypertension I10    Mixed hyperlipidemia E78.2    PVD (peripheral vascular disease) (HCC) I73.9    Protein calorie malnutrition ([facility]) E46    Cellulitis of left lower extremity L03. 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 Performed by: Ector Horowitz DPM    Consent obtained:  Yes    Time out taken:  Yes    Pain Control: Anesthetic  Anesthetic: 4% Lidocaine Liquid Topical (2.5 ml)       Debridement:Excisional Debridement    Using curette, #15 blade, and forceps the wound(s)/ulcer(s) was/were sharply debrided down through and including the removal of epidermis, dermis and subcutaneous tissue. Devitalized Tissue Debrided:  fibrin, biofilm, slough and callus    Pre Debridement Measurements:  Are located in the [facility]  Documentation Flow Sheet    Wound/Ulcer #: 1 and 2    Post Debridement Measurements:  Wound/Ulcer Descriptions are Pre Debridement except measurements:    Wound 10/02/17 Foot Left;Medial #1 Left Medial Foot-Noticed on 9/10/17 (Active)   Wound Image   2/14/2018  1:35 PM   Wound Type Wound 3/28/2018  1:36 PM   Wound Arterial 2/14/2018  1:35 PM   Dressing Status Intact; Old drainage 3/28/2018  1:36 PM   Dressing Changed Changed/New 3/28/2018  3:09 PM   Dressing/Treatment Dry dressing 3/28/2018  3:09 PM   Wound Length (cm) 0.9 cm 3/28/2018  1:52 PM   Wound Width (cm) 1 cm 3/28/2018  1:52 PM   Wound Depth (cm)  0.3 3/28/2018  1:52 PM   Calculated Wound Size (cm^2) (l*w) 0.9 cm^2 3/28/2018  1:52 PM   Change in Wound Size % (l*w) 75 3/28/2018  1:52 PM   Undermining Starts ___ O'Clock 12 2/14/2018  1:35 PM   Undermining Ends___ O'Clock 12 2/14/2018  1:35 PM   Undermining Maxium Distance (cm) 0.2 2/14/2018  1:35 PM   Wound Assessment Granulation tissue;Slough 3/28/2018  1:36 PM   Drainage Amount Small 3/28/2018  1:36 PM   Drainage Description Serosanguinous 3/28/2018  1:36 PM   Odor None 3/28/2018  1:36 PM   Margins Attached edges 3/28/2018  1:36 PM   Exposed structure Bone 2/28/2018  1:12 PM   Sonia-wound Assessment Maceration 3/28/2018  1:36 PM   Non-staged Wound Description Full thickness 3/28/2018  1:36 PM   [place]%Wound Bed 95 3/14/2018  1:40 PM   Red%Wound Bed 80 3/28/2018  1:36 PM   Yellow%Wound Bed 20 3/28/2018  1:36 PM   Black%Wound Bed 10 10/2/2017  3:06 PM   Other%Wound Bed 5 11/29/2017  1:25 PM   Op First Treatment Date 10/02/17 2/14/2018  1:35 PM   Number of days: 177       Wound 02/14/18 Foot Right;Lateral Wound #2; noted 2/14/18 (Active)   Wound Image   2/14/2018  1:46 PM   Wound Type Wound 3/28/2018  1:36 PM   Wound Pressure Stage  2 2/14/2018  1:35 PM   Dressing Status Intact; Old drainage 3/28/2018  1:36 PM   Dressing Changed Changed/New 3/28/2018  3:09 PM   Dressing/Treatment Dry dressing 3/28/2018  3:09 PM   Wound Length (cm) 0.5 cm 3/28/2018  1:52 PM   Wound Width (cm) 0.5 cm 3/28/2018  1:52 PM   Wound Depth (cm)  0.2 3/28/2018  1:52 PM   Calculated Wound Size (cm^2) (l*w) 0.25 cm^2 3/28/2018  1:52 PM   Change in Wound Size % (l*w) 77.27 3/28/2018  1:52 PM   Wound Assessment Dry;Black 3/28/2018  1:36 PM   Drainage Amount None 3/28/2018  1:36 PM   Drainage Description Yellow 3/14/2018  1:40 PM   Odor None 3/28/2018  1:36 PM   Margins Undefined edges 3/28/2018  1:36 PM   Sonia-wound Assessment Clean;Dry 3/28/2018  1:36 PM   Non-staged Wound Description Full thickness 3/28/2018  1:36 PM   [place]%Wound Bed 10 3/14/2018  1:40 PM   Yellow%Wound Bed 90 3/14/2018  1:40 PM   Black%Wound Bed 100 3/28/2018  1:36 PM   Op First Treatment Date 02/14/18 2/14/2018  1:46 PM   Number of days: become too tight call the center or completely remove the wrap.                                     [] Elevate leg(s) above the level of the heart when sitting.                                        [] Avoid prolonged standing in one place.     Off-Loading:   [] Off-loading when                  [] walking                     [] JG bed           [] sitting  [] Total non-weight bearing  [] Right Leg  [] Left Leg                    [x] Assistive Device                   [] Walker                      [] Cane             [x] Wheelchair                [] Crutches                        [] Surgical shoe    [] Podus Boot(s)   [] Foam Boot(s)  [] Roll About                        [] Cast Boot                 [] CROW Boot  [] Other:     Dietary:  [x] Diet as tolerated:                  [] Calorie Diabetic Diet:           [] No Added Salt:  [x] Increase Protein:  PROTEIN SUPPLEMENT TWICE PER DAY BETWEEN MEALS                [] Other:               Activity:  [x] Activity as tolerated:  [] Patient has no activity restrictions     [] Strict Bedrest:                      [] Remain off Work:     [] 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 [employer]     Return Appointment:  [] Wound and dressing supply provider:   [x] ECF or Home Healthcare: [facility]  [] Nurse visit:               [] Physician or NP scheduled for Nurse Visit:    [x] Return Appointment: With DR Jordy Nye  in  1 Week(s),   [] Ordered tests:       ** Wear Prevlon Boots to Bilateral Feet when Patient is in Bed- (Script Given in Clinic on 1/31/18)**     **PLEASE GIVE MULTIVITAMIN ONE TABLET DAILY**       Nurse Case Manger: [facility]   Electronically signed by Best Garcia RN on 3/28/2018 at 1:59 PM   [id number] [email or web address]  N Information: Should you experience any significant changes in your wound(s) or have questions about your wound care, please contact the [address]

## 2018-04-11 ENCOUNTER — HOSPITAL ENCOUNTER (OUTPATIENT)
Dept: WOUND CARE | Age: 83
Discharge: HOME | End: 2018-04-11
Admitting: PODIATRIST

## 2018-04-11 VITALS
HEART RATE: 75 BPM | SYSTOLIC BLOOD PRESSURE: 128 MMHG | DIASTOLIC BLOOD PRESSURE: 79 MMHG | TEMPERATURE: 98.4 F | RESPIRATION RATE: 16 BRPM

## 2018-04-11 RX ORDER — CITALOPRAM 10 MG/1
10 TABLET ORAL DAILY
COMMUNITY

## 2018-04-18 ENCOUNTER — HOSPITAL ENCOUNTER (OUTPATIENT)
Dept: WOUND CARE | Age: 83
Discharge: HOME | End: 2018-04-18
Attending: PODIATRIST | Admitting: PODIATRIST

## 2018-04-18 VITALS
RESPIRATION RATE: 16 BRPM | TEMPERATURE: 97.5 F | HEART RATE: 89 BPM | SYSTOLIC BLOOD PRESSURE: 91 MMHG | DIASTOLIC BLOOD PRESSURE: 59 MMHG

## 2018-04-18 RX ORDER — LIDOCAINE HYDROCHLORIDE 40 MG/ML
SOLUTION TOPICAL PRN
Status: DISCONTINUED | OUTPATIENT
Start: 2018-04-18 | End: 2018-04-19 | Stop reason: HOSPADM

## 2018-04-25 ENCOUNTER — HOSPITAL ENCOUNTER (OUTPATIENT)
Dept: WOUND CARE | Age: 83
Discharge: HOME | End: 2018-04-25
Attending: PODIATRIST | Admitting: PODIATRIST

## 2018-04-25 VITALS
RESPIRATION RATE: 16 BRPM | DIASTOLIC BLOOD PRESSURE: 66 MMHG | TEMPERATURE: 97.6 F | HEART RATE: 68 BPM | SYSTOLIC BLOOD PRESSURE: 107 MMHG

## 2018-05-02 ENCOUNTER — HOSPITAL ENCOUNTER (OUTPATIENT)
Dept: WOUND CARE | Age: 83
Discharge: HOME | End: 2018-05-02
Attending: PODIATRIST | Admitting: PODIATRIST

## 2018-05-02 VITALS
TEMPERATURE: 97.8 F | SYSTOLIC BLOOD PRESSURE: 111 MMHG | DIASTOLIC BLOOD PRESSURE: 68 MMHG | RESPIRATION RATE: 18 BRPM | HEART RATE: 73 BPM

## 2018-05-02 RX ORDER — LIDOCAINE HYDROCHLORIDE 40 MG/ML
SOLUTION TOPICAL PRN
Status: DISCONTINUED | OUTPATIENT
Start: 2018-05-02 | End: 2018-05-03 | Stop reason: HOSPADM

## 2018-05-23 ENCOUNTER — HOSPITAL ENCOUNTER (OUTPATIENT)
Dept: WOUND CARE | Age: 83
Discharge: HOME | End: 2018-05-23
Attending: PODIATRIST | Admitting: PODIATRIST

## 2018-05-23 RX ORDER — LIDOCAINE HYDROCHLORIDE 40 MG/ML
SOLUTION TOPICAL PRN
Status: DISCONTINUED | OUTPATIENT
Start: 2018-05-23 | End: 2018-05-24 | Stop reason: HOSPADM

## 2018-05-30 ENCOUNTER — HOSPITAL ENCOUNTER (OUTPATIENT)
Dept: WOUND CARE | Age: 83
Discharge: HOME | End: 2018-05-30
Attending: PODIATRIST | Admitting: PODIATRIST

## 2018-05-30 RX ORDER — LIDOCAINE HYDROCHLORIDE 40 MG/ML
SOLUTION TOPICAL PRN
Status: DISCONTINUED | OUTPATIENT
Start: 2018-05-30 | End: 2018-05-31 | Stop reason: HOSPADM

## 2018-06-06 ENCOUNTER — HOSPITAL ENCOUNTER (OUTPATIENT)
Dept: WOUND CARE | Age: 83
Discharge: HOME | End: 2018-06-06
Attending: PODIATRIST | Admitting: PODIATRIST

## 2018-06-06 VITALS
DIASTOLIC BLOOD PRESSURE: 53 MMHG | SYSTOLIC BLOOD PRESSURE: 95 MMHG | TEMPERATURE: 97.9 F | HEART RATE: 78 BPM | RESPIRATION RATE: 18 BRPM

## 2018-06-06 RX ORDER — LIDOCAINE HYDROCHLORIDE 40 MG/ML
SOLUTION TOPICAL PRN
Status: DISCONTINUED | OUTPATIENT
Start: 2018-06-06 | End: 2018-06-07 | Stop reason: HOSPADM

## 2018-06-13 ENCOUNTER — HOSPITAL ENCOUNTER (OUTPATIENT)
Dept: WOUND CARE | Age: 83
Discharge: HOME | End: 2018-06-13
Attending: PODIATRIST | Admitting: PODIATRIST

## 2018-06-13 VITALS
DIASTOLIC BLOOD PRESSURE: 66 MMHG | SYSTOLIC BLOOD PRESSURE: 116 MMHG | TEMPERATURE: 98.1 F | RESPIRATION RATE: 18 BRPM | HEART RATE: 96 BPM

## 2018-06-13 RX ORDER — LIDOCAINE HYDROCHLORIDE 40 MG/ML
SOLUTION TOPICAL PRN
Status: DISCONTINUED | OUTPATIENT
Start: 2018-06-13 | End: 2018-06-14 | Stop reason: HOSPADM

## 2018-06-20 ENCOUNTER — HOSPITAL ENCOUNTER (OUTPATIENT)
Dept: WOUND CARE | Age: 83
Discharge: HOME | End: 2018-06-20
Attending: PODIATRIST | Admitting: PODIATRIST

## 2018-06-20 VITALS
DIASTOLIC BLOOD PRESSURE: 69 MMHG | RESPIRATION RATE: 20 BRPM | SYSTOLIC BLOOD PRESSURE: 113 MMHG | TEMPERATURE: 97.9 F | HEART RATE: 88 BPM

## 2019-01-01 ENCOUNTER — OUTSIDE SERVICES (OUTPATIENT)
Dept: WOUND CARE | Age: 84
End: 2019-01-01
Payer: MEDICARE

## 2019-01-01 DIAGNOSIS — I70.249 ATHEROSCLEROSIS OF NATIVE ARTERY OF LEFT LOWER EXTREMITY WITH ULCERATION, UNSPECIFIED ULCERATION SITE (HCC): ICD-10-CM

## 2019-01-01 DIAGNOSIS — I70.245 ATHEROSCLEROSIS OF NATIVE ARTERY OF LEFT LOWER EXTREMITY WITH ULCERATION OF OTHER PART OF FOOT (HCC): ICD-10-CM

## 2019-01-01 DIAGNOSIS — I70.248 ATHEROSCLEROSIS OF NATIVE ARTERY OF LEFT LOWER EXTREMITY WITH ULCERATION OF OTHER PART OF LOWER LEG (HCC): ICD-10-CM

## 2019-01-01 DIAGNOSIS — S81.812A NONINFECTED SKIN TEAR OF LEFT LOWER EXTREMITY, INITIAL ENCOUNTER: ICD-10-CM

## 2019-01-01 DIAGNOSIS — S81.812D NONINFECTED SKIN TEAR OF LEFT LOWER EXTREMITY, SUBSEQUENT ENCOUNTER: ICD-10-CM

## 2019-01-01 PROCEDURE — 99308 SBSQ NF CARE LOW MDM 20: CPT | Performed by: CLINICAL NURSE SPECIALIST

## 2019-08-21 NOTE — PROGRESS NOTES
thickness     Description of periwound: Fragile skin, intact    Description of wound bed: Wound with pink and red granulation, nonviable tissue and fibrin. Wound bed moist, moderate amount of serous drainage, edges open and attached. Surrounding tissue and ulcer without signs and symptoms of infection. No purulence, malodor, erythema, increased temperature, or increased pain. Wound exam:    Wound location: Left foot   Length (cm) 1.3   Width (cm) 1.5   Depth (cm) 0.1   Tunneling 0   Undermining 0    Wound type:   Skin tear with PVD  Grade - stage - thickness: Full thickness     Description of periwound: Intact with fragile skin    Description of wound bed: Wound with pink granulation and fibrin. Wound bed moist, moderate amount of serous drainage, edges open and attached. Surrounding tissue and ulcer without signs and symptoms of infection. No purulence, malodor, erythema, increased temperature, or increased pain. Wound exam:    Wound location: Left 5th toe   Length (cm) 0.5   Width (cm) 0.5   Depth (cm) 0.1   Tunneling 0   Undermining 0    Wound type: Arterial  Grade - stage - thickness: Partial thickness     Description of periwound: Macerated with macerated web spaces    Description of wound bed: Wound with pink base. Edges open and attached. No signs or symptoms of infection. No purulence, malodor, erythema, increased temperature, or increased pain.  _______________________    Recent labs and data reviewed: No recent labs  _______________________     Caryle Sicks diagnoses & assessment:  Left lower leg skin tear with PVD, left foot skin tear with PVD, left fifth toe vascular ulcer. Requiring debridement but will decline today, will try autolytic debridement for the week first.    Debridement is  indicated today, based on the history and exam above, as above.  _______________________    Procedure:    Consent obtained. Time out performed per [facility].      Dressings

## 2019-09-25 NOTE — PROGRESS NOTES
[facility]    Patient name: Mario Montaño  :   8-  Facility:  [address] [place] [address] Service: nursing facility (96)    Primary diagnosis for wound-care consultation: Arterial ulcer left lower leg, left lateral foot and left 5th toe. Slow to heal.     Additional ulcer(s) noted?  none    History of Present Illness: Patient with PVD and hypertension. Patient with dementia. Fragile skin. Patient then and frail. Review of Systems: Pertinent systems reviewed in the HPI; all other systems reviewed, and negative. Pertinent elements of past medical, surgical, family, and/or social history: No changes this week    Medications and allergies are detailed in the nursing home chart, and were reviewed by me today.  _______________________    General Physical exam:    Vital signs:  Blood pressure 131/71, temperature 90.7, pulse 73, respiration 16    General Appearance: alert and oriented to person, thin, frail and fragile, in no acute distress  Psychiatric:  Mood and affect appropriate for situation  Skin: warm and dry, no rash  Head: normocephalic and atraumatic  Eyes: pupils equal, round, sclerae anicteric, conjunctivae normal  ENT: no thrush or oral ulcers  Neck:No complaints, normal appearance  Pulmonary/Chest: Respirations easy at rest, no cough or respiratory distress  Cardiovascular: No chest pain, normal rate, toes warm, cap refill normal, PT and DP pulse absent with doppler  Abdomen: No nausea or vomiting  Extremities: no cyanosis, edema or cellulitis  Musculoskeletal: Ambulatory, takes a few steps, moves all extremities, no deformities  Neurologic: distal sensation to light touch intact, no allodynia. Wound exam:    Wound location: Left lower leg   Length (cm) 3   Width (cm) 2   Depth (cm) 0.3   Tunneling 0   Undermining 0    Wound type:    Arterial  Grade - stage - thickness: Full thickness     Description of periwound: Intact, fragile skin    Description of wound bed: Wound with red granulation. Wound bed moist, moderate amount of serous drainage, edges open and attached. Surrounding tissue and ulcer without signs and symptoms of infection. No purulence, malodor, erythema, increased temperature, or increased pain. Wound exam:    Wound location: Left lateral foot - resolved   Length (cm) 0   Width (cm) 0   Depth (cm) 0   Tunneling 0   Undermining 0    Wound type: Arterial    Wound exam:    Wound location: Left 5th toe   Length (cm) 0.1   Width (cm) 0.1   Depth (cm) 0.1   Tunneling 0   Undermining 0    Wound type: Arterial  Grade - stage - thickness: Partial thickness     Description of periwound: Slight maceration    Description of wound bed: Wound with pink base. Wound bed moist, small amount of serous drainage, edges open and attached. Surrounding tissue and ulcer without signs and symptoms of infection. No purulence, malodor, erythema, increased temperature, or increased pain.  _______________________    Recent labs and data reviewed: No new labs  _______________________     [facility] diagnoses & assessment:  Vascular/PVD ulcers to left lower leg, left foot, and left fifth toe. Slow to heal    Debridement is not indicated today, based on the history and exam above.  _______________________    Procedure:    Consent obtained. Time out performed per [facility]. Dressing applied  _______________________    Recommendations:    - Dressings / Compression / Offloading: Change dressing to left lower leg to aquacel ag and allevyn. Betadine and aquacel ag to left 5th toe. Dermasavers    - Labs / Diagnostic studies: None    - Medications / nutritional support: None.       - Further Consultations recommended: None    - Anticipated follow-up: Weekly evaluation  _______________________    Electronically signed by KEMAL Mejia CNP on 9/25/2019 at 11:06 AM